# Patient Record
(demographics unavailable — no encounter records)

---

## 2017-04-09 NOTE — RADHPO
EXAM DATE/TIME:  04/09/2017 03:28 

 

[facility] COMPARISON:     

CHEST SINGLE AP, April 09, 2017, 2:31.

 

 

INDICATIONS :     

Trauma. Fall.

                  

 

ORAL CONTRAST:      

No oral contrast ingested.

                  

 

RADIATION DOSE:     

6.68 CTDIvol (mGy) 

 

 

MEDICAL HISTORY :     

Carcinoma, esophageal. Carcinoma, gastric. 

 

SURGICAL HISTORY :       

Partial gastrectomy

 

ENCOUNTER:      

Initial

 

ACUITY:      

1 day

 

PAIN SCALE:      

10/10

 

LOCATION:       

Left  hip

 

TECHNIQUE:     

Volumetric scanning of the abdomen and pelvis was performed.  Using automated exposure control and ad
justment of the mA and/or kV according to patient size, radiation dose was kept as low as reasonably 
achievable to obtain optimal diagnostic quality images. 

 

FINDINGS:     

 

LOWER LUNGS:     

The patient is status post distal esophagectomy and gastrectomy. Small bowel seen in the posterior ri
ght chest and at the left base accounting for the density seen on the chest x-ray. There is mild scar
ring or linear atelectasis seen at the posterior medial left lung base.

 

LIVER:     

Homogeneous density without lesion.  There is no dilation of the biliary tree. The patient is status 
post cholecystectomy.

 

SPLEEN:     

Normal size without lesion.

 

PANCREAS:     

Within normal limits. 

 

KIDNEYS:     

Normal in size and shape.  There is no stone, or hydronephrosis. There is a 0.6 cm hyperdense mass at
 the posterior lateral mid left kidney likely representing a hemorrhagic or proteinaceous cyst.

 

ADRENAL GLANDS:     

Within normal limits.

 

VASCULAR:     

There is no aortic aneurysm. Vascular calcifications are seen throughout the arterial system.

 

BOWEL/MESENTERY:     

The patient is status post gastrectomy. There's a moderate amount of stool seen throughout the colon.


 

ABDOMINAL WALL:     

Within normal limits.

 

RETROPERITONEUM:     

There is no lymphadenopathy.

 

BLADDER:     

No wall thickening or mass.

 

REPRODUCTIVE:     

Within normal limits.

 

INGUINAL:     

There is no lymphadenopathy or hernia.

 

MUSCULOSKELETAL:     

There is a left intertrochanteric femoral neck fracture. There is hemorrhage seen in the soft tissues
 of the upper thigh around the fracture.

 

CONCLUSION:     

1. Left intertrochanteric femoral neck fracture.

2. Post operative changed from distal esophagectomy and gastrectomy.

 

 

 

 John Dowling MD on April 09, 2017 at 3:48           

Board Certified Radiologist.

 This report was verified electronically.

## 2017-04-09 NOTE — MB
cc:

BRAULIO TRIVEDI M.D.

****

 

 

DATE OF CONSULTATION:  4/09/2017

 

REASON FOR CONSULTATION:

Left intertrochanteric hip fracture.

 

HISTORY

The patient is a 66-year-old male who presents to [facility]

Emergency Room after a fall in a group home.  The patient developed severe

onset of left hip pain after this fall.  He was unable to stand or ambulate.

He was brought to the emergency room and x-rays confirmed evidence of a

displaced left intertrochanteric hip fracture.  He denies hitting his head,

denies loss of consciousness or other significant complaints.  The patient is

constant, severe throbbing.  The patient is admitted to the medical service.

Orthopedic surgery has been consulted for further evaluation and management of

the injury and condition.

 

PAST MEDICAL HISTORY:

Positive for anxiety, depression.

Esophageal cancer with partial esophajectomy.

Gastrectomy.

 

SOCIAL HISTORY

No tobacco, alcohol or substance abuse.

 

ALLERGIES

COMPAZINE, PENICILLIN, SULFA

 

MEDICATIONS

Include:

1. Abilify.

2. Wellbutrin.

3. Trazodone.

4. Namenda

5. Venlafaxine.

6. Loperamide.

 

REVIEW OF SYSTEMS:

Negative for ten systems other than in the HPI.

 

 

 

PHYSICAL EXAMINATION:

The patient is a well-developed male lying in bed, in mild distress related to

his left hip.  He is awake, alert.

SKIN: Warm and dry.

HEENT: Normocephalic, atraumatic. Pupils are round. No scleral icterus.

NECK: Supple.

LUNGS: Clear.

HEART: Regular rate and rhythm.

ABDOMEN: Soft, nontender.

His left thigh is swollen and ecchymotic.  He has ____ motion of the left thigh

region.  He flexes his ankles and toes.  Brisk cap refill sensation intact

distally.

VITAL SIGNS:  Temperature 99, pulse of 80, respiratory rate 18, blood pressure

118/74.

 

LABORATORY DATA:

White blood cell count is 9, hemoglobin 12, hematocrit 36, platelet 165, BUN

55, creatinine 3.7, glucose 147.

 

IMPRESSION

66-year old male status post fall, left displaced intertrochanteric hip

fracture.  He does have kidney disease with a creatinine of 3.7.  He has

elevated glucose.

 

Of note, CT scan of the head shows no intracranial abnormality.

 

Discussed the diagnosis with the patient and the treatment options, options of

nonoperative versus surgery.  Surgery consists of open reduction, internal

fixation.

 

The risks of surgery were discussed which include but not limited to

anesthesia, bleeding, infection, damage to nerves, blood vessels, pain,

stiffness, failure of hardware, nonunion, malunion, blood clots, even death.

The patient has asked appropriate questions to have an answer.  He is in favor

of proceeding with surgery.  He does wish to have his left hip fracture

repaired surgically.  Written consent has been obtained. Surgical site has been

marked. I did speak with the patient's POA by telephone and explained the 
diagnosis, 

treatment options, risks, benefits. POA did conscent for surgery. 

 

 

                              _________________________________

                              MD JOHNIE Moore/NICOLE

D:  4/9/2017/8:35 AM

T:  4/9/2017/9:23 AM

Visit #:  [id number]

Job #:  [id number]

MTDD

## 2017-04-09 NOTE — PD
HPI


Chief Complaint:  Fall


Time Seen by Provider:  01:54


Travel History


International Travel<30 days:  No


Contact w/Intl Traveler<30days:  No


Traveled to known affect area:  No





History of Present Illness


HPI


66-year-old male presents to the emergency department by nonemergent transport 

from his group home with a group home staff member for evaluation of hip pain.  

Patient reportedly around 10 PM was requesting a wheelchair and reportedly 

there was not a wheelchair available and the patient was reportedly witnessed 

to have an acting out spell throwing himself on the floor.  This was reportedly 

was a witnessed event.  Patient complained of hip pain afterwards.  Due to his 

persistent complaint of pain was determined to bring him to the emergency room.

  Patient did hit his head, he did not have loss of consciousness, he did not 

injure his neck, he did not injure his back chest abdomen or upper extremities.

  Patient did not injure his right lower extremity.  Patient complains of hip 

pain.  After the patient fell onto the floor.  The group home staff members put 

him into a wheelchair at that time.  Patient had a bowel movement so they took 

him to the shower and while he was at the shower he had a witnessed syncopal 

episode on the floor without injury.  Patient spontaneously regained 

consciousness reportedly.  Patient was base and then put back in a wheelchair.  

Because of hip pain was brought to the emergency room.  Patient's had no 

further syncope or near syncope however noted upon arrival to have low normal 

pressure and on recheck was hypotensive.





PFSH


Past Medical History


*** Narrative Medical


Anxiety depression esophageal cancer with partial esophagectomy gastrectomy 

dementia (; no tobacco use no alcohol use no substance use; nursing notes 

reviewed


Anxiety:  Yes


Depression:  Yes


Cancer:  Yes (esophageal and gastric)


Dementia:  Yes


Tetanus Vaccination:  < 5 Years


Influenza Vaccination:  Yes





Past Surgical History


Abdominal Surgery:  Yes


Other Surgery:  Yes (partial gastrectomy)





Social History


Alcohol Use:  No


Tobacco Use:  No


Substance Use:  No





Allergies-Medications


(Allergen,Severity, Reaction):  


Coded Allergies:  


     Compazine (Unverified  Allergy, Unknown, 4/9/17)


     Penicillin (Unverified  Allergy, Unknown, 4/9/17)


     Sulfa (Unverified  Allergy, Unknown, 4/9/17)


Reported Meds & Prescriptions





Reported Meds & Active Scripts


Active


Abilify (Aripiprazole) 2 Mg Tab 2 Mg PO DAILY@0600


Wellbutrin SR 12 HR (Bupropion HCl) 150 Mg Tab 150 Mg PO 1QAM,1Q4PM


Trazodone (Trazodone HCl) 50 Mg Tab 50 Mg PO HS


Namenda (Memantine) 10 Mg Tab 10 Mg PO BID


Venlafaxine ER 24 HR (Venlafaxine HCl) 150 Mg Cap 150 Mg PO HS


Reported


Loperamide (Loperamide HCl) 2 Mg Cap 2 Mg PO AS DIRECTED PRN


     One capsule after each loose stool.


     Not to exceed 8 capsules per day.


Integra (Multi-Vit/Iron-B Comp-Vit C) Unknown Strength Cap Unknown Dose  








Review of Systems


Except as stated in HPI:  all other systems reviewed are Neg


General / Constitutional:  No: Fever


HENT:  No: Congestion, Neck Pain


Cardiovascular:  No: Chest Pain or Discomfort


Respiratory:  No: Shortness of Breath


Gastrointestinal:  No: Abdominal Pain


Genitourinary:  No: Flank Pain


Musculoskeletal:  Positive: Pain (hip)


Skin:  Positive Rash (chronic)


Neurologic:  Positive: Syncope,  No: Weakness, Dizziness, Focal Abnormalities, 

Coordination Problem, Headache, Change in Mentation, Slurred Speech, Paresthesia

, Seizures


Psychiatric:  No: Anxiety


Endocrine:  No: Heat Intolerance


Hematologic/Lymphatic:  No: Easy Bruising





Physical Exam


Narrative


GENERAL: Well-developed thin disheveled male in no acute distress no 

respiratory distress; GCS 14 ('normal' baseline per caregiver-Chrales Cohen- at 

bedside) thinking.  


SKIN: Warm and dry.


HEAD: Atraumatic. Normocephalic except for posterior scalp abrasion no 

laceration no soft tissue swelling no bony abnormalities. 


EYES: Pupils equal and round. No scleral icterus. No injection or drainage. 


ENT: No nasal bleeding or discharge.  Mucous membranes pink and moist.


NECK: Trachea midline. No JVD.  No midline tenderness to direct palpation no 

bony step-off.


CARDIOVASCULAR: Regular rate and rhythm.  


RESPIRATORY: No accessory muscle use. Clear to auscultation. Breath sounds 

equal bilaterally. 


GASTROINTESTINAL: Abdomen soft, non-tender, nondistended. Hepatic and splenic 

margins not palpable. 


MUSCULOSKELETAL: Extremities without clubbing, cyanosis, or edema.  Left lower 

extremity shortened and internally rotated with left hip deformity; bilateral 

dorsalis pedis pulses 2+ to palpation. 


NEUROLOGICAL: Awake and alert. No obvious cranial nerve deficits.  Motor 

grossly within normal limits. Five out of 5 muscle strength in the arms and 

legs.  Normal speech.





Data


Data


Last Documented VS





Vital Signs








  Date Time  Temp Pulse Resp B/P Pulse Ox O2 Delivery O2 Flow Rate FiO2


 


4/9/17 04:25  80 18 118/74 99 Room Air  


 


4/9/17 01:40 99.3       








Orders





 Electrocardiogram (4/9/17 01:54)


Complete Blood Count With Diff (4/9/17 01:54)


Comprehensive Metabolic Panel (4/9/17 01:54)


Prothrombin Time / Inr (Pt) (4/9/17 01:54)


Act Partial Throm Time (Ptt) (4/9/17 01:54)


Urinalysis - C+S If Indicated (4/9/17 01:54)


Type And Screen (4/9/17 01:54)


Chest, Single Ap (4/9/17 01:54)


Femur (Ap & Lat/2vws) (4/9/17 01:54)


Iv Access Insert/Monitor (4/9/17 01:54)


Oximetry (4/9/17 01:54)


Ecg Monitoring (4/9/17 01:54)


Sodium Chloride 0.9% Flush (Ns Flush) (4/9/17 02:00)


Sodium Chlor 0.9% 1000 Ml Inj (Ns 1000 M (4/9/17 02:00)


Ct Brain W/O Iv Contrast(Rout) (4/9/17 )


Ct Cerv Spine W/O Contrast (4/9/17 )


Pelvis, Ap Only (Routine) (4/9/17 01:54)


Sodium Chlorid 0.9% 500 Ml Inj (Ns 500 M (4/9/17 03:15)


Lactic Acid (4/9/17 03:12)


Ct Abd/Pel W/O Iv Contrast (4/9/17 )


Magnesium (Mg) (4/9/17 02:00)


Admit Order (Ed Use Only) (4/9/17 )


^ Saline Lock (4/9/17 04:37)


Resp Oxygen [place] C Titrat 1-4 L (4/9/17 )


Notify Dr: Other (4/9/17 04:37)


Sodium Chloride 0.9% Flush (Ns Flush) (4/9/17 09:00)


Sodium Chloride 0.9% Flush (Ns Flush) (4/9/17 04:45)


Consult Orthopedic (4/9/17 04:37)





Labs





 Laboratory Tests








Test 4/9/17 4/9/17





 02:00 04:15


 


White Blood Count 9.9 TH/MM3 


 


Red Blood Count 4.07 MIL/MM3 


 


Hemoglobin 12.1 GM/DL 


 


Hematocrit 36.5 % 


 


Mean Corpuscular Volume 89.8 FL 


 


Mean Corpuscular Hemoglobin 29.7 PG 


 


Mean Corpuscular Hemoglobin 33.1 % 





Concent  


 


Red Cell Distribution Width 13.3 % 


 


Platelet Count 165 TH/MM3 


 


Mean Platelet Volume 7.8 FL 


 


Neutrophils (%) (Auto) 79.2 % 


 


Lymphocytes (%) (Auto) 9.8 % 


 


Monocytes (%) (Auto) 10.8 % 


 


Eosinophils (%) (Auto) 0.0 % 


 


Basophils (%) (Auto) 0.2 % 


 


Neutrophils # (Auto) 7.8 TH/MM3 


 


Lymphocytes # (Auto) 1.0 TH/MM3 


 


Monocytes # (Auto) 1.1 TH/MM3 


 


Eosinophils # (Auto) 0.0 TH/MM3 


 


Basophils # (Auto) 0.0 TH/MM3 


 


CBC Comment DIFF FINAL  


 


Differential Comment   


 


Prothrombin Time 15.0 SEC 


 


Prothromb Time International 1.3 RATIO 





Ratio  


 


Activated Partial 26.9 SEC 





Thromboplast Time  


 


Sodium Level 138 MEQ/L 


 


Potassium Level 5.0 MEQ/L 


 


Chloride Level 99 MEQ/L 


 


Carbon Dioxide Level 23.5 MEQ/L 


 


Anion Gap 16 MEQ/L 


 


Blood Urea Nitrogen 55 MG/DL 


 


Creatinine 3.70 MG/DL 


 


Estimat Glomerular Filtration 17 ML/MIN 





Rate  


 


Random Glucose 147 MG/DL 


 


Calcium Level 8.6 MG/DL 


 


Magnesium Level 2.5 MG/DL 


 


Total Bilirubin 0.3 MG/DL 


 


Aspartate Amino Transf 128 U/L 





(AST/SGOT)  


 


Alanine Aminotransferase 82 U/L 





(ALT/SGPT)  


 


Alkaline Phosphatase 114 U/L 


 


Total Protein 7.6 GM/DL 


 


Albumin 3.3 GM/DL 


 


Blood Type O POSITIVE  


 


Antibody Screen NEGATIVE  


 


Blood Bank Comment   


 


Lactic Acid Level  1.5 mmol/L











MDM


Medical Decision Making


Medical Screen Exam Complete:  Yes


Emergency Medical Condition:  Yes


Medical Record Reviewed:  Yes


Interpretation(s)


CBC & BMP Diagram


4/9/17 02:00








EKG normal sinus rhythm rate 81 no acute ST elevation or injury pattern change 

noted


Differential Diagnosis


Hip pain, hip fracture, dislocation, anemia, pelvic fracture, syncope, 

arrhythmia, ICH, CHI, abrasion


Narrative Course


Patient placed on monitor IV access obtained specimens were collected; patient 

was noted to be hypotensive therefore IV fluids administered; imaging studies 

ordered


CT brain noncontrast reveals no acute intra-cranial abnormality no skull 

fracture or cervical spine shows chronic changes but no fracture chest x-ray 

there was evidence of previous esophageal and gastric surgery but no acute 

abnormalities pelvis and left hip x-ray reveals an intertrochanteric fracture 

of the left hip.  CT abdomen and pelvis performed without contrast due to renal 

function reveals no acute intra-abdominal or pelvic abnormality again 

intertrochanteric fracture is identified and local area of hemorrhage also 

noted.


Patient's case discussed with on-call orthopedist Dr. De Santiago requests patient 

to be transferred to [facility] admitted to have his group: Patient's case 

discussed with Dr. Salazar accepted patient for admission





Physician Communication


Physician Communication


Discussed with Dr De Santiago admit to [facility] service





Diagnosis





 Primary Impression:  


 Closed intertrochanteric fracture of hip


 Qualified Code:  S72.142A - Closed intertrochanteric fracture of hip, left, 

initial encounter


 Additional Impressions:  


 Renal insufficiency


 Dementia


 Qualified Code:  F03.91 - Dementia with behavioral disturbance, unspecified 

dementia type





Admitting Information


Admitting Physician Requests:  Admit








Gaby Rivera MD Apr 9, 2017 04:22

## 2017-04-09 NOTE — HHI.HP
__________________________________________________





[facility]


Service


[facility]


Primary Care Physician


Non-Staff


Admission Diagnosis


L hip fracture; renal insufficiency; syncope


Diagnoses:  


(1) Closed fracture of intertrochanteric section of femur


(2) Closed intertrochanteric fracture of hip


(3) CKD (chronic kidney disease) stage 4, GFR 15-29 ml/min


(4) Dementia


(5) Anxiety and depression


Chief Complaint:  


hip pain


Travel History


International Travel<30 Days:  No


Contact w/Intl Traveler <30 Da:  No


Traveled to Known Affected Are:  No


History of Present Illness


66yrs old man with a PMH of Dementia, Anxiety/Depression and a resident of a 

Local group home was brought to the ED for evaluation of hip pain s/p 

mechanical fall which happened around 10PM last night when patient became upset 

with the staff as a wheelchair that was requested and was not available at the 

time; patient threw himself on the floor. Pelvic and femur x-ray revealed 

finding of  Intertrochanteric left femoral neck fracture for which Orthopedic 

surgery was consulted and patient taken to the OR today and underwent open 

reduction internal fixation.  Patient was seen in PACU, where he was stable





Review of Systems


Other 12 systems reviewed and are negative except for the one mentioned in 

history of present illness





Past Family Social History


Past Medical History


esophageal cancer with partial esophagectomy gastrectomy 


Anxiety:  Yes


Depression:  Yes


Dementia: 





Past Surgical History


partial gastrectomy








Reported Medications


Abilify (Aripiprazole) 2 Mg Tab 2 Mg PO DAILY@0600


Wellbutrin SR 12 HR (Bupropion HCl) 150 Mg Tab 150 Mg PO 1QAM,1Q4PM


Trazodone (Trazodone HCl) 50 Mg Tab 50 Mg PO HS


Namenda (Memantine) 10 Mg Tab 10 Mg PO BID


Venlafaxine ER 24 HR (Venlafaxine HCl) 150 Mg Cap 150 Mg PO HS


Reported


Loperamide (Loperamide HCl) 2 Mg Cap 2 Mg PO AS DIRECTED PRN


     One capsule after each loose stool.


     Not to exceed 8 capsules per day.


Integra (Multi-Vit/Iron-B Comp-Vit C) Unknown Strength Cap Unknown Dose


Allergies:  


Coded Allergies:  


     Compazine (Unverified  Allergy, Unknown, 4/9/17)


     Penicillin (Unverified  Allergy, Unknown, 4/9/17)


     Sulfa (Unverified  Allergy, Unknown, 4/9/17)


Social History


Alcohol Use:  No


Tobacco Use:  No


Substance Use:  No





Physical Exam


Vital Signs





 Vital Signs








  Date Time  Temp Pulse Resp B/P Pulse Ox O2 Delivery O2 Flow Rate FiO2


 


4/9/17 09:25 99.7    99   


 


4/9/17 07:43 99.7 84 17 109/71 95   


 


4/9/17 06:59   18     


 


4/9/17 06:58  87 18 96/62 98 Room Air  


 


4/9/17 06:25  85 18 105/68 97 Room Air  


 


4/9/17 05:03     98   21


 


4/9/17 05:03  82 18 121/71 98 Room Air  


 


4/9/17 04:43  87 17 106/41 98 Room Air  


 


4/9/17 04:25  80 18 118/74 99 Room Air  


 


4/9/17 04:00  84 17 105/74 99 Room Air  


 


4/9/17 03:18  84 16 92/64  Room Air  


 


4/9/17 03:00  85 17 92/64 94 Room Air  


 


4/9/17 01:40 99.3 89 18 86/58 97 Room Air  


 


4/9/17 01:21      Room Air  


 


4/9/17 01:00 99.6 89 18 103/66 98   


 


4/9/17 00:20 98.7 102 18 80/60 99   








Physical Exam


GENERAL: This is a well-nourished, well-developed patient, in no apparent 

distress.


SKIN: No rashes, ecchymoses or lesions. Cool and dry.


HEAD: Atraumatic. Normocephalic. No temporal or scalp tenderness.


EYES: Pupils equal round and reactive. Extraocular motions intact. No scleral 

icterus. No injection or drainage. 


ENT: Nose without bleeding, purulent drainage or septal hematoma. Throat 

without erythema, tonsillar hypertrophy or exudate. Uvula midline. Airway 

patent.


NECK: Trachea midline. No JVD or lymphadenopathy. Supple, nontender, no 

meningeal signs.


CARDIOVASCULAR: Regular rate and rhythm without murmurs, gallops, or rubs. 


RESPIRATORY: Clear to auscultation. Breath sounds equal bilaterally. No wheezes

, rales, or rhonchi.  


GASTROINTESTINAL: Abdomen soft, non-tender, nondistended. No hepato-splenomegaly

, or palpable masses. No guarding.


MUSCULOSKELETAL: Extremities without clubbing, cyanosis, or edema. No joint 

tenderness, effusion, or edema noted. No calf tenderness. Negative Homans sign 

bilaterally.


NEUROLOGICAL: Awake and alert. Cranial nerves II through XII intact.  Motor and 

sensory grossly within normal limits. Five out of 5 muscle strength in all 

muscle groups.  Normal speech.


Laboratory





Laboratory Tests








Test 4/9/17 4/9/17 4/9/17





 02:00 04:15 05:00


 


White Blood Count 9.9   


 


Red Blood Count 4.07   


 


Hemoglobin 12.1   


 


Hematocrit 36.5   


 


Mean Corpuscular Volume 89.8   


 


Mean Corpuscular Hemoglobin 29.7   


 


Mean Corpuscular Hemoglobin 33.1   





Concent   


 


Red Cell Distribution Width 13.3   


 


Platelet Count 165   


 


Mean Platelet Volume 7.8   


 


Neutrophils (%) (Auto) 79.2   


 


Lymphocytes (%) (Auto) 9.8   


 


Monocytes (%) (Auto) 10.8   


 


Eosinophils (%) (Auto) 0.0   


 


Basophils (%) (Auto) 0.2   


 


Neutrophils # (Auto) 7.8   


 


Lymphocytes # (Auto) 1.0   


 


Monocytes # (Auto) 1.1   


 


Eosinophils # (Auto) 0.0   


 


Basophils # (Auto) 0.0   


 


CBC Comment DIFF FINAL   


 


Differential Comment    


 


Prothrombin Time 15.0   


 


Prothromb Time International 1.3   





Ratio   


 


Activated Partial 26.9   





Thromboplast Time   


 


Sodium Level 138   


 


Potassium Level 5.0   


 


Chloride Level 99   


 


Carbon Dioxide Level 23.5   


 


Anion Gap 16   


 


Blood Urea Nitrogen 55   


 


Creatinine 3.70   


 


Estimat Glomerular Filtration 17   





Rate   


 


Random Glucose 147   


 


Calcium Level 8.6   


 


Magnesium Level 2.5   


 


Total Bilirubin 0.3   


 


Aspartate Amino Transf 128   





(AST/SGOT)   


 


Alanine Aminotransferase 82   





(ALT/SGPT)   


 


Alkaline Phosphatase 114   


 


Total Protein 7.6   


 


Albumin 3.3   


 


Blood Type O POSITIVE   


 


Antibody Screen NEGATIVE   


 


Blood Bank Comment    


 


Lactic Acid Level  1.5  


 


Urine Color   YELLOW 


 


Urine Turbidity   SLIGHT 


 


Urine pH   5.5 


 


Urine Specific Gravity   1.021 


 


Urine Protein   30 


 


Urine Glucose (UA)   NEG 


 


Urine Ketones   NEG 


 


Urine Occult Blood   LARGE 


 


Urine Nitrite   NEG 


 


Urine Bilirubin   NEG 


 


Urine Leukocyte Esterase   NEG 


 


Urine RBC   4-9 


 


Urine WBC   0-2 


 


Urine Squamous Epithelial   0-5 





Cells   


 


Urine Amorphous Sediment   FEW 


 


Urine Bacteria   NONE 


 


Microscopic Urinalysis Comment   CATH-CULT NOT





   IND








Result Diagram:  


4/9/17 0200 4/9/17 0200





Imaging





Last Impressions








Pelvis X-Ray 4/9/17 0154 Signed





Impressions: 





 Service Date/Time:  Sunday, April 9, 2017 02:27 - CONCLUSION:  Left 





 intertrochanteric femoral neck fracture.     John Dowling MD 


 


Femur X-Ray 4/9/17 0154 Signed





Impressions: 





 Service Date/Time:  Sunday, April 9, 2017 02:32 - CONCLUSION:  

Intertrochanteric 





 left femoral neck fracture.     John Dowling MD 


 


Chest X-Ray 4/9/17 0154 Signed





Impressions: 





 Service Date/Time:  Sunday, April 9, 2017 02:31 - CONCLUSION:  1. 

Postoperative 





 changes in the right chest which appear stable. 2. Increased density in the 





 medial left base representing either a consolidation or potentially a hiatal 





 hernia. There are some air lucency in this.     John Dowling MD 


 


Head CT 4/9/17 0000 Signed





Impressions: 





 Service Date/Time:  Sunday, April 9, 2017 02:56 - CONCLUSION:  1. No 





 intracranial abnormality is seen. 2. Right sinus disease.     John Dowling MD 


 


Cervical Spine CT 4/9/17 0000 Signed





Impressions: 





 Service Date/Time:  Sunday, April 9, 2017 02:56 - CONCLUSION:  Scattered 





 degenerative change as described above. An acute bony abnormality is not seen.

   





  John Dowling MD 


 


Abdomen/Pelvis CT 4/9/17 0000 Signed





Impressions: 





 Service Date/Time:  Sunday, April 9, 2017 03:28 - CONCLUSION:  1. Left 





 intertrochanteric femoral neck fracture. 2. Post operative changed from distal 





 esophagectomy and gastrectomy.     John Dowling MD 











Assessment and Plan


Problem List:  


(1) Closed intertrochanteric fracture of hip


ICD Code:  S72.143A


Status:  Acute


(2) Dementia


ICD Code:  F03.90


Status:  Acute


(3) CKD (chronic kidney disease) stage 4, GFR 15-29 ml/min


ICD Code:  N18.4


Status:  Acute


Assessment and Plan


66-year-old man with





Closed intertrochanteric fracture of the left hip: Pelvic and femur x-ray noted 

and reviewed by me with finding of  Intertrochanteric left femoral neck fracture

; consult orthopedic surgery for evaluation for open reduction internal 

fixation.  PT consult post procedure.Pain Management accordingly with when 

necessary analgesic/morphine





Chronic kidney disease stage IV: Check renal ultrasound and consult nephrology.

  Continue with gentle IV fluid hydrations and monitor BUN and creatinine.  

Avoid all nephrotoxic drugs.





Dementia: Resume Namenda





Anxiety/depression: Resume outpatient medications





DVT prophylaxis: Postprocedure per orthopedic surgery


Code Status


Full code





Physician Certification


2 Midnight Certification Type:  Admission for Inpatient Services


Order for Inpatient Services


The services are ordered in accordance with Medicare regulations or non-

Medicare payer requirements, as applicable.  In the case of services not 

specified as inpatient-only, they are appropriately provided as inpatient 

services in accordance with the 2-midnight benchmark.


Estimated LOS (days):  2


 days is the estimated time the patient will need to remain in the hospital, 

assuming treatment plan goals are met and no additional complications.


Post-Hospital Plan:  Not yet determined





Problem Qualifiers





(1) Closed intertrochanteric fracture of hip:  


Qualified Code:  S72.142A - Closed intertrochanteric fracture of hip, left, 

initial encounter


(2) Dementia:  


Qualified Code:  F03.91 - Dementia with behavioral disturbance, unspecified 

dementia type





Calixto Cárdenas MD Apr 9, 2017 10:46

## 2017-04-09 NOTE — RADRPT
EXAM DATE/TIME:  04/09/2017 21:32 

 

[facility] COMPARISON:     

No previous studies available for comparison.

 

EXTERNAL COMPARISON :    

[facility], CT ABDOMEN & PELVIS W CONTRAST, January 26, 2016[facility], CT ABDOMEN 
& PELVIS W CONTRAST, January 05, 2015. 

 

 

INDICATIONS :     

Increased BUN/Creatinine. 

                                                        

 

MEDICAL HISTORY :           

Dementia. Gastric and esophageal cancer. 

 

SURGICAL HISTORY :          

Partial gastrectomy. 

 

ENCOUNTER:     

Initial

 

ACUITY:     

1 day

 

PAIN SCORE:     

3/10

 

LOCATION:     

Bilateral flank 

MEASUREMENTS:     

 

RIGHT KIDNEY:     

8.8 x 3.6 x 3.6 cm

 

LEFT KIDNEY:     

8.7 x 4.2 x 4.7 cm

 

FINDINGS:     

 

RIGHT KIDNEY:     

Renal cortex is normal in thickness and echotexture.  No hydronephrosis, stone, or mass.  

 

LEFT KIDNEY:     

Renal cortex is normal in thickness and echotexture.  No hydronephrosis, stone, or mass.  

 

BLADDER:     

Within normal limits given the degree of distension.  

 

CONCLUSION:     Normal examination.  

 

 

 

 Marcial Wells MD on April 09, 2017 at 22:18           

Board Certified Radiologist.

 This report was verified electronically.

## 2017-04-09 NOTE — RADHPO
EXAM DATE/TIME:  04/09/2017 02:32 

 

[facility] COMPARISON:     

No previous studies available for comparison.

 

                     

INDICATIONS :     

Fall. Left hip pain.

                     

 

MEDICAL HISTORY :     

None.          

 

SURGICAL HISTORY :     

None.   

 

ENCOUNTER:     

Initial                                        

 

ACUITY:     

1 day      

 

PAIN SCORE:     

9/10

 

LOCATION:     

Left lateral 

 

FINDINGS:     

There is an intertrochanteric femoral neck fracture. There is a separate fracture fragment at the les
ser trochanter. The femoral head is normally positioned at the acetabulum.

 

CONCLUSION:     

Intertrochanteric left femoral neck fracture.

 

 

 

 John Dowling MD on April 09, 2017 at 2:49           

Board Certified Radiologist.

 This report was verified electronically.

## 2017-04-09 NOTE — RADHPO
EXAM DATE/TIME:  04/09/2017 02:56 

 

[facility] COMPARISON:     

CT BRAIN W/O CONTRAST, May 05, 2015, 21:08.

 

 

INDICATIONS :     

Trauma. Fall.

                      

 

RADIATION DOSE:     

64.44 CTDIvol (mGy) 

 

 

 

MEDICAL HISTORY :     

Carcinoma, esophageal. Carcinoma, gastric. 

 

SURGICAL HISTORY :       

Partial gastrectomy

 

ENCOUNTER:      

Initial

 

ACUITY:      

1 day

 

PAIN SCALE:      

0/10

 

LOCATION:        

cranial 

 

TECHNIQUE:     

Multiple contiguous axial images were obtained of the head.  Using automated exposure control and adj
ustment of the mA and/or kV according to patient size, radiation dose was kept as low as reasonably a
chievable to obtain optimal diagnostic quality images. 

 

FINDINGS:     

 

CEREBRUM:     

The ventricles are normal for age.  No evidence of midline shift, mass lesion, hemorrhage or acute in
farction.  No extra-axial fluid collections are seen.

 

POSTERIOR FOSSA:     

The cerebellum and brainstem are intact.  The 4th ventricle is midline.  The cerebellopontine angle i
s unremarkable.

 

EXTRACRANIAL:     

The visualized portion of the orbits is intact. There is opacification of the right maxillary sinus w
ith expansion into the nasal cavity likely representing a mucocele or polyposis. This was present pre
viously. There is right frontal and right ethmoid sinus disease.

 

SKULL:     

The calvaria is intact.  No evidence of skull fracture.

 

CONCLUSION:     

1. No intracranial abnormality is seen.

2. Right sinus disease.

 

 

 

 John Dowling MD on April 09, 2017 at 3:21           

Board Certified Radiologist.

 This report was verified electronically.

## 2017-04-09 NOTE — RADHPO
EXAM DATE/TIME:  04/09/2017 02:27 

 

[facility] COMPARISON:     

FEMUR LEFT (AP & LAT/2VWS), April 09, 2017, 2:32.

 

                     

INDICATIONS :     

Fall. Left hip pain.

                     

 

MEDICAL HISTORY :     

None.          

 

SURGICAL HISTORY :     

None.   

 

ENCOUNTER:     

Initial                                        

 

ACUITY:     

1 day      

 

PAIN SCORE:     

9/10

 

LOCATION:     

Right pelvis 

 

FINDINGS:     

There is a left intertrochanteric femoral neck fracture. There is fracture through the superior aspec
t of the greater trochanter and at the base of the lesser trochanter. The femoral head is normally po
sitioned at the acetabulum. No other fracture is seen. Vascular calcifications are present.

 

CONCLUSION:     

Left intertrochanteric femoral neck fracture.

 

 

 

 John Dowling MD on April 09, 2017 at 2:50           

Board Certified Radiologist.

 This report was verified electronically.

## 2017-04-09 NOTE — RADHPO
EXAM DATE/TIME:  04/09/2017 02:31 

 

[facility] COMPARISON:     

SPINE THORACIC AP/LAT/SW (3VW), May 05, 2015, 20:41.

 

                     

INDICATIONS :     

Fall. 

                     

 

MEDICAL HISTORY :     

None.          

 

SURGICAL HISTORY :     

None.   

 

ENCOUNTER:     

Initial                                        

 

ACUITY:     

1 day      

 

PAIN SCORE:     

6/10

 

LOCATION:     

Bilateral chest 

 

FINDINGS:     

The heart size is normal. There is increased density at the medial right chest and over the medial hi
lar region. Staples are seen in this region. This configuration is unchanged for prior thoracic spine
 series from 5/5/2015. There is increased density in the retrocardiac area at the medial left base. T
he lungs are otherwise clear. No effusion is seen.

 

CONCLUSION:     

1. Postoperative changes in the right chest which appear stable.

2. Increased density in the medial left base representing either a consolidation or potentially a hia
patria hernia. There are some air lucency in this.

 

 

 

 John Dowling MD on April 09, 2017 at 2:51           

Board Certified Radiologist.

 This report was verified electronically.

## 2017-04-09 NOTE — RADRPT
EXAM DATE/TIME:  04/09/2017 10:59 

 

[facility] COMPARISON:     

FEMUR LEFT (AP & LAT/2VWS), April 09, 2017, 2:32.

 

                     

INDICATIONS :     

Left hip pain.

                     

 

MEDICAL HISTORY :     

None.          

 

SURGICAL HISTORY :     

None.   

 

ENCOUNTER:     

Initial                                        

 

ACUITY:     

1 day      

 

PAIN SCORE:     

Non-responsive.

 

LOCATION:     

Left  hip

 

FINDINGS:     

2 fluoroscopic views of the postoperative left hip demonstrate placement of intramedullary morelia and sc
rew through the intertrochanteric fracture with good anatomic alignment.

 

 

CONCLUSION:     

Status post ORIF of a left intratrochanteric fracture with good anatomic alignment.

 

 

 

 Susan Mathur MD on April 09, 2017 at 11:40           

Board Certified Radiologist.

 This report was verified electronically.

## 2017-04-10 NOTE — EKG
Date Performed: 04/09/2017       Time Performed: 02:00:52

 

PTAGE:      66 years

 

EKG:      Sinus arrhythmia. Since previous tracing, no significant change noted Normal ECG

 

PREVIOUS TRACING       : 10/13/2014 10.14

 

DOCTOR:   Karuna Don  Interpretating Date/Time  04/10/2017 14:12:58

## 2017-04-10 NOTE — HHI.PR
Subjective


Remarks


F-u left hip fx s/p repair


04/10/17-patient seen and examined; no acute event overnight. Pain to the left 

hip stable





Objective


Vitals





 Vital Signs








  Date Time  Temp Pulse Resp B/P Pulse Ox O2 Delivery O2 Flow Rate FiO2


 


4/10/17 09:59     94 Nasal Cannula 2.00 


 


4/10/17 09:31   18     


 


4/10/17 07:07 98.6 94 16 115/71 100   


 


4/10/17 00:00 98.7 88 17 118/70 100   


 


4/9/17 20:43     100 Nasal Cannula 2.00 


 


4/9/17 20:00 98.7 92 17 125/74 100   


 


4/9/17 16:00 97.3 78 16 92/60 100   








 I/O








 4/9/17 4/9/17 4/9/17 4/10/17 4/10/17 4/10/17





 07:00 15:00 23:00 07:00 15:00 23:00


 


Intake Total 1500 ml 1990 ml 764 ml 876 ml  


 


Output Total 400 ml 1225 ml 425 ml 650 ml  


 


Balance 1100 ml 765 ml 339 ml 226 ml  


 


      


 


Intake Oral  440 ml 240 ml 120 ml  


 


IV Total 1500 ml 250 ml 524 ml 756 ml  


 


Other  1300 ml    


 


Output Urine Total 400 ml 950 ml 425 ml 650 ml  


 


Estimated Blood Loss  75 ml    


 


Other  200 ml    


 


# Voids  0    


 


# Bowel Movements  0 0 0  








Result Diagram:  


4/10/17 0558                                                                   

             4/10/17 0558





Imaging





Last Impressions








Pelvis X-Ray 4/9/17 0154 Signed





Impressions: 





 Service Date/Time:  Sunday, April 9, 2017 02:27 - CONCLUSION:  Left 





 intertrochanteric femoral neck fracture.     John Dowling MD 


 


Femur X-Ray 4/9/17 0154 Signed





Impressions: 





 Service Date/Time:  Sunday, April 9, 2017 02:32 - CONCLUSION:  

Intertrochanteric 





 left femoral neck fracture.     John Dowling MD 


 


Chest X-Ray 4/9/17 0154 Signed





Impressions: 





 Service Date/Time:  Sunday, April 9, 2017 02:31 - CONCLUSION:  1. 

Postoperative 





 changes in the right chest which appear stable. 2. Increased density in the 





 medial left base representing either a consolidation or potentially a hiatal 





 hernia. There are some air lucency in this.     John Dowling MD 


 


Renal Ultrasound 4/9/17 0000 Signed





Impressions: 





 Service Date/Time:  Sunday, April 9, 2017 21:32 - CONCLUSION: Normal 





 examination.       Marcial Wells MD 


 


Hip X-Ray 4/9/17 0000 Signed





Impressions: 





 Service Date/Time:  Sunday, April 9, 2017 10:59 - CONCLUSION:  Status post 

ORIF 





 of a left intratrochanteric fracture with good anatomic alignment.     





 Susan Mathur MD 


 


Head CT 4/9/17 0000 Signed





Impressions: 





 Service Date/Time:  Sunday, April 9, 2017 02:56 - CONCLUSION:  1. No 





 intracranial abnormality is seen. 2. Right sinus disease.     John Dowling MD 


 


Cervical Spine CT 4/9/17 0000 Signed





Impressions: 





 Service Date/Time:  Sunday, April 9, 2017 02:56 - CONCLUSION:  Scattered 





 degenerative change as described above. An acute bony abnormality is not seen.

   





  John Dowling MD 


 


Abdomen/Pelvis CT 4/9/17 0000 Signed





Impressions: 





 Service Date/Time:  Sunday, April 9, 2017 03:28 - CONCLUSION:  1. Left 





 intertrochanteric femoral neck fracture. 2. Post operative changed from distal 





 esophagectomy and gastrectomy.     John Dowling MD 








Objective Remarks


GENERAL: NAD


SKIN: Warm and dry.


HEAD: Normocephalic.


EYES: No scleral icterus. No injection or drainage. 


NECK: Supple, trachea midline. No JVD or lymphadenopathy.


CARDIOVASCULAR: Regular rate and rhythm without murmurs, gallops, or rubs. 


RESPIRATORY: Breath sounds equal bilaterally. No accessory muscle use.


GASTROINTESTINAL: Abdomen soft, non-tender, nondistended. 


MUSCULOSKELETAL: No cyanosis, or edema. dressing over left hip incision


BACK: Nontender without obvious deformity. No CVA tenderness.








A/P


Problem List:  


(1) Closed fracture of intertrochanteric section of femur


ICD Code:  S72.143A


Status:  Acute


(2) Closed intertrochanteric fracture of hip


ICD Code:  S72.143A


Status:  Acute


(3) CKD (chronic kidney disease) stage 4, GFR 15-29 ml/min


ICD Code:  N18.4


Status:  Acute


(4) Dementia


ICD Code:  F03.90


Status:  Acute


(5) Anxiety and depression


ICD Code:  F41.9


Status:  Acute


Assessment and Plan


66-year-old man with





Closed intertrochanteric fracture of the left hip: Pelvic and femur x-ray with 

finding of  Intertrochanteric left femoral neck fracture; s/p ORIF 04/09/17 and 

management per orthopedic surgery.  PT to treat and eval .Pain Management 

accordingly with when necessary analgesic/morphine





Chronic kidney disease stage IV: Renal indices improving; renal ultrasound 

normal.  Continue with gentle IV fluid hydrations and monitor BUN and 

creatinine.  Avoid all nephrotoxic drugs.





Dementia: continue Namenda





Anxiety/depression: continue outpatient medications





DVT prophylaxis: Lovenox





Problem Qualifiers





(1) Closed intertrochanteric fracture of hip:  


Qualified Code:  S72.142A - Closed intertrochanteric fracture of hip, left, 

initial encounter


(2) Dementia:  


Qualified Code:  F03.91 - Dementia with behavioral disturbance, unspecified 

dementia type





Calixto Cárdenas MD Apr 10, 2017 12:36


Calixto Cárdenas MD Apr 10, 2017 12:36

## 2017-04-10 NOTE — PD.ORT.PN
Subjective


Post Op Day #:  1


Subjective Remarks


pain under control.





Objective


Vitals





 Vital Signs








  Date Time  Temp Pulse Resp B/P Pulse Ox O2 Delivery O2 Flow Rate FiO2


 


4/10/17 09:59     94 Nasal Cannula 2.00 


 


4/10/17 09:31   18     


 


4/10/17 07:07 98.6 94 16 115/71 100   


 


4/10/17 00:00 98.7 88 17 118/70 100   


 


4/9/17 20:43     100 Nasal Cannula 2.00 


 


4/9/17 20:00 98.7 92 17 125/74 100   


 


4/9/17 16:00 97.3 78 16 92/60 100   








 I/O








 4/9/17 4/9/17 4/9/17 4/10/17 4/10/17 4/10/17





 07:00 15:00 23:00 07:00 15:00 23:00


 


Intake Total 1500 ml 1990 ml 764 ml 876 ml  


 


Output Total 400 ml 1225 ml 425 ml 650 ml  


 


Balance 1100 ml 765 ml 339 ml 226 ml  


 


      


 


Intake Oral  440 ml 240 ml 120 ml  


 


IV Total 1500 ml 250 ml 524 ml 756 ml  


 


Other  1300 ml    


 


Output Urine Total 400 ml 950 ml 425 ml 650 ml  


 


Estimated Blood Loss  75 ml    


 


Other  200 ml    


 


# Voids  0    


 


# Bowel Movements  0 0 0  








Result Diagram:  


4/10/17 0558                                                                   

             4/10/17 0558





Objective Remarks


in bed, nad


dressing c/d/i


neg homans


nvi





Assessment & Plan


Ortho Post Op Day #:  1


Problem List:  


Assessment and Plan


s/p L troch nail


50% PWB


daily dressing changes


lovenox


d/c planning


f/up dr. ozuna 2 weeks








Anshu Ruiz Apr 10, 2017 12:46

## 2017-04-11 NOTE — MP
cc:

BRAULIO TRIVEDI M.D.

****

 

 

DATE OF SURGERY:  4/9/2017

 

PREOPERATIVE DIAGNOSIS

Left intertrochanteric hip fracture.

 

POSTOPERATIVE DIAGNOSIS

Left intertrochanteric hip fracture.

 

PROCEDURE

Intramedullary nailing left intertrochanteric hip fracture.

 

SURGEON

Dr. Braulio Trivedi

 

FIRST ASSISTANT

Braulio Ruiz PA-C

 

ANESTHESIA

General.

 

ESTIMATED BLOOD LOSS

100 cc.

 

COMPLICATIONS

None.

 

IMPLANTS USED

Synthes.

 

JUSTIFICATION

This patient is a 66-year-old male who sustained a severe injury to his left

hip after a fall with a displaced comminuted intertrochanteric hip fracture.

He was evaluated by the undersigned after consultation and admission to [facility].  The patient as well as the patient's power of  were

counseled as to the risks, benefits and alternatives to the above-named

proposed surgical procedure and they did wish to proceed with surgery.

 

PROCEDURE IN DETAIL

Written consent was obtained, and also appropriate consent from power of

.  The patient was identified, taken to the operating room and placed

supine on the operating table.  General anesthesia was administered as well as

900 mg of IV clindamycin and one gram of IV vancomycin as he does have a

penicillin allergy.  The patient was carefully transferred to the fracture

table.  All bony prominences and pressure points were well-padded.  A padded

traction boot was placed on the left foot and the right leg placed in a padded

well-leg ley.  Longitudinal traction was applied to assist in preliminary

fracture reduction.  The left hip and left lower extremity were prepped and

draped using isopropyl alcohol, Hibiclens solution and ChloraPrep solution.

 

After a timeout was performed, a longitudinal incision was made over the

lateral aspect of the left hip.  The fascial layer was incised.  A guidewire

was used to gain entrance into the intramedullary canal of the femur from the

tip of the greater trochanter.  This was followed by a cannulated entry reamer.

Subsequently, a Synthes 11 mm titanium trochanteric femoral nail was inserted

into the intramedullary canal of the femur.  A 130 degree locking jig was used

to place a guide pin centered into the femoral head on the AP and lateral

fluoroscopic projections.  Subsequently, a 95 mm spiral blade was inserted.

The top locking screw was secured to get a fixed angle sliding construct.

Distally a locking jig was used to place a single lateral to medial transverse

static locking screw.  Fluoroscopic imaging again confirmed hardware placement

and fracture reduction.

 

The surgical wound was thoroughly irrigated with sterile saline solution.  The

fascial layer was closed with #1 Vicryl suture, the subcutaneous tissue layer

with 2-0 Vicryl suture and the skin was closed with Dermabond.  Sterile

dressing was applied.  The patient tolerated the procedure well.  No

intraoperative complications noted.

 

Braulio Ruiz, physician assistant certified, was present during the entire

procedure to include patient positioning and the procedure itself.  The medical

necessity of a physician assistant was indicated in this case due to the

complexity of the procedure.  He assisted with appropriate manipulation of the

leg and also retraction of muscle, tendon, bone and neurovascular structures.

He assisted with both achieving and maintaining fracture reduction along with

preparation of bone and implantation of the internal fixation device.

 

 

 

 

                              _________________________________

                              Braulio Trivedi MD

 

 

 

JWM/STACIA

D:  4/9/2017/11:08 AM

T:  4/11/2017/9:16 AM

Visit #:  [id number]

Job #:  [id number]

## 2017-04-11 NOTE — PD.ORT.PN
Subjective


Post Op Day #:  2


Subjective Remarks


pain under control.





Objective


Vitals





 Vital Signs








  Date Time  Temp Pulse Resp B/P Pulse Ox O2 Delivery O2 Flow Rate FiO2


 


4/11/17 00:10 98.9 87 17 114/71 96   


 


4/10/17 20:10 99.1 96 17 121/80 98   


 


4/10/17 18:09     94 Nasal Cannula 2.00 


 


4/10/17 16:00 97.7 92 16 112/73 95   


 


4/10/17 12:00 98.2 100 16 107/70 100   


 


4/10/17 09:59     94 Nasal Cannula 2.00 


 


4/10/17 09:31   18     








 I/O








 4/10/17 4/10/17 4/10/17 4/11/17 4/11/17 4/11/17





 07:00 15:00 23:00 07:00 15:00 23:00


 


Intake Total 876 ml 480 ml 240 ml 120 ml  


 


Output Total 650 ml  850 ml 3 ml  


 


Balance 226 ml 480 ml -610 ml 117 ml  


 


      


 


Intake Oral 120 ml 480 ml 240 ml 120 ml  


 


IV Total 756 ml     


 


Output Urine Total 650 ml  850 ml 3 ml  


 


# Voids  0    


 


# Bowel Movements 0 0 0 0  








Result Diagram:  


4/11/17 0701                                                                   

             4/11/17 0701





Objective Remarks


in bed, nad


incision no erythema, no drainage


neg homans


nvi





Assessment & Plan


Ortho Post Op Day #:  2


Problem List:  


Assessment and Plan


s/p L troch nail


50% PWB


daily dressing changes


lovenox


d/c planning


ortho stable


f/up dr. ozuna 2 weeks








Anshu Ruiz Apr 11, 2017 08:51

## 2017-04-11 NOTE — HHI.PR
Subjective


Remarks


F-u left hip fx s/p repair


04/10/17-patient seen and examined; no acute event overnight. Pain to the left 

hip stable


04/11/17-patient seen and examined, no change and stable.  Pain to left hip 

well controlled.





Objective


Vitals





 Vital Signs








  Date Time  Temp Pulse Resp B/P Pulse Ox O2 Delivery O2 Flow Rate FiO2


 


4/11/17 09:06   18     


 


4/11/17 08:00 97.4 80 18 111/72 98   


 


4/11/17 00:10 98.9 87 17 114/71 96   


 


4/10/17 20:10 99.1 96 17 121/80 98   


 


4/10/17 18:09     94 Nasal Cannula 2.00 


 


4/10/17 16:00 97.7 92 16 112/73 95   


 


4/10/17 12:00 98.2 100 16 107/70 100   








 I/O








 4/10/17 4/10/17 4/10/17 4/11/17 4/11/17 4/11/17





 07:00 15:00 23:00 07:00 15:00 23:00


 


Intake Total 876 ml 480 ml 240 ml 120 ml  


 


Output Total 650 ml  850 ml 3 ml  


 


Balance 226 ml 480 ml -610 ml 117 ml  


 


      


 


Intake Oral 120 ml 480 ml 240 ml 120 ml  


 


IV Total 756 ml     


 


Output Urine Total 650 ml  850 ml 3 ml  


 


# Voids  0    


 


# Bowel Movements 0 0 0 0  








Result Diagram:  


4/11/17 0701 4/11/17 0701





Imaging





Last Impressions








Pelvis X-Ray 4/9/17 0154 Signed





Impressions: 





 Service Date/Time:  Sunday, April 9, 2017 02:27 - CONCLUSION:  Left 





 intertrochanteric femoral neck fracture.     John Dowling MD 


 


Femur X-Ray 4/9/17 0154 Signed





Impressions: 





 Service Date/Time:  Sunday, April 9, 2017 02:32 - CONCLUSION:  

Intertrochanteric 





 left femoral neck fracture.     John Dowling MD 


 


Chest X-Ray 4/9/17 0154 Signed





Impressions: 





 Service Date/Time:  Sunday, April 9, 2017 02:31 - CONCLUSION:  1. 

Postoperative 





 changes in the right chest which appear stable. 2. Increased density in the 





 medial left base representing either a consolidation or potentially a hiatal 





 hernia. There are some air lucency in this.     John Dowling MD 


 


Renal Ultrasound 4/9/17 0000 Signed





Impressions: 





 Service Date/Time:  Sunday, April 9, 2017 21:32 - CONCLUSION: Normal 





 examination.       Marcial Wells MD 


 


Hip X-Ray 4/9/17 0000 Signed





Impressions: 





 Service Date/Time:  Sunday, April 9, 2017 10:59 - CONCLUSION:  Status post 

ORIF 





 of a left intratrochanteric fracture with good anatomic alignment.     





 Susan Mathur MD 


 


Head CT 4/9/17 0000 Signed





Impressions: 





 Service Date/Time:  Sunday, April 9, 2017 02:56 - CONCLUSION:  1. No 





 intracranial abnormality is seen. 2. Right sinus disease.     John Dowling MD 


 


Cervical Spine CT 4/9/17 0000 Signed





Impressions: 





 Service Date/Time:  Sunday, April 9, 2017 02:56 - CONCLUSION:  Scattered 





 degenerative change as described above. An acute bony abnormality is not seen.

   





  John Dowling MD 


 


Abdomen/Pelvis CT 4/9/17 0000 Signed





Impressions: 





 Service Date/Time:  Sunday, April 9, 2017 03:28 - CONCLUSION:  1. Left 





 intertrochanteric femoral neck fracture. 2. Post operative changed from distal 





 esophagectomy and gastrectomy.     John Dowling MD 








Objective Remarks


GENERAL: NAD


SKIN: Warm and dry.


HEAD: Normocephalic.


EYES: No scleral icterus. No injection or drainage. 


NECK: Supple, trachea midline. No JVD or lymphadenopathy.


CARDIOVASCULAR: Regular rate and rhythm without murmurs, gallops, or rubs. 


RESPIRATORY: Breath sounds equal bilaterally. No accessory muscle use.


GASTROINTESTINAL: Abdomen soft, non-tender, nondistended. 


MUSCULOSKELETAL: No cyanosis, or edema. dressing over left hip incision


BACK: Nontender without obvious deformity. No CVA tenderness.





Procedures


Intramedullary nailing left intertrochanteric hip fracture.





A/P


Problem List:  


(1) Closed fracture of intertrochanteric section of femur


ICD Code:  S72.143A


Status:  Acute


(2) Closed intertrochanteric fracture of hip


ICD Code:  S72.143A


Status:  Acute


(3) CKD (chronic kidney disease) stage 4, GFR 15-29 ml/min


ICD Code:  N18.4


Status:  Acute


(4) Dementia


ICD Code:  F03.90


Status:  Acute


(5) Anxiety and depression


ICD Code:  F41.9


Status:  Acute


Assessment and Plan


66-year-old man with





Closed intertrochanteric fracture of the left hip: Pelvic and femur x-ray with 

finding of  Intertrochanteric left femoral neck fracture; s/p Intramedullary 

nailing left intertrochanteric hip fracture 04/09/17 and management per 

orthopedic surgery.  PT to treat and eval .Pain Management accordingly with 

when necessary analgesic/morphine





Chronic kidney disease stage IV: Renal indices improving BUn/Cr 24/1.54; renal 

ultrasound normal.  Continue with gentle IV fluid hydrations and monitor BUN 

and creatinine.  Avoid all nephrotoxic drugs.





Dementia: continue Namenda





Anxiety/depression: continue outpatient medications





DVT prophylaxis: Lovenox


Discharge Planning


Likely discharge 04/12/17 to SNF





Problem Qualifiers





(1) Closed intertrochanteric fracture of hip:  


Qualified Code:  S72.142A - Closed intertrochanteric fracture of hip, left, 

initial encounter


(2) Dementia:  


Qualified Code:  F03.91 - Dementia with behavioral disturbance, unspecified 

dementia type





Calixto Cárdenas MD Apr 11, 2017 10:53

## 2017-04-12 NOTE — HHI.DS
__________________________________________________





Discharge Summary


Admission Date


Apr 9, 2017 at 04:40


Discharge Date:  Apr 12, 2017


Admitting Diagnosis


L hip fracture; renal insufficiency; syncope





(1) Closed fracture of intertrochanteric section of femur


ICD Code:  S72.143A


(2) Closed intertrochanteric fracture of hip


ICD Code:  S72.143A


(3) CKD (chronic kidney disease) stage 4, GFR 15-29 ml/min


ICD Code:  N18.4


(4) Dementia


ICD Code:  F03.90


(5) Anxiety and depression


ICD Code:  F41.9


Procedures


Intramedullary nailing left intertrochanteric hip fracture.


Brief History - From Admission


66yrs old man with a PMH of Dementia, Anxiety/Depression and a resident of a 

Local group home was brought to the ED for evaluation of hip pain s/p 

mechanical fall which happened around 10PM last night when patient became upset 

with the staff as a wheelchair that was requested and was not available at the 

time; patient threw himself on the floor. Pelvic and femur x-ray revealed 

finding of  Intertrochanteric left femoral neck fracture for which Orthopedic 

surgery was consulted and patient taken to the OR today and underwent open 

reduction internal fixation.  Patient was seen in PACU, where he was stable


CBC/BMP:  


4/12/17 0617                                                                   

             4/12/17 0617





Significant Findings





Laboratory Tests








Test 4/10/17 4/11/17 4/12/17





 05:58 07:01 06:17


 


Red Blood Count 3.09 MIL/MM3 3.00 MIL/MM3 3.00 MIL/MM3





 (4.50-5.90) (4.50-5.90) (4.50-5.90)


 


Hemoglobin 9.4 GM/DL 8.8 GM/DL 9.0 GM/DL





 (13.0-17.0) (13.0-17.0) (13.0-17.0)


 


Hematocrit 27.4 % 27.3 % 26.9 %





 (39.0-51.0) (39.0-51.0) (39.0-51.0)


 


Platelet Count 93 TH/MM3 119 TH/MM3 





 (150-450) (150-450) 


 


Band Neutrophils % 19 % (0-6)  


 


Platelet Estimate LOW  (NORMAL)  


 


Blood Urea Nitrogen 36 MG/DL (7-18) 24 MG/DL (7-18) 26 MG/DL (7-18)


 


Creatinine 2.08 MG/DL 1.54 MG/DL 1.53 MG/DL





 (0.60-1.30) (0.60-1.30) (0.60-1.30)


 


Estimat Glomerular Filtration 32 ML/MIN (>89) 45 ML/MIN (>89) 46 ML/MIN (>89)





Rate   


 


Calcium Level 7.7 MG/DL 8.3 MG/DL 





 (8.5-10.1) (8.5-10.1) 


 


Aspartate Amino Transf 137 U/L (15-37)  





(AST/SGOT)   


 


Total Protein 5.3 GM/DL  





 (6.4-8.2)  


 


Albumin 2.4 GM/DL  





 (3.4-5.0)  


 


Random Glucose   121 MG/DL





   ()








Imaging





Last Impressions








Pelvis X-Ray 4/9/17 0154 Signed





Impressions: 





 Service Date/Time:  Sunday, April 9, 2017 02:27 - CONCLUSION:  Left 





 intertrochanteric femoral neck fracture.     John Dowling MD 


 


Femur X-Ray 4/9/17 0154 Signed





Impressions: 





 Service Date/Time:  Sunday, April 9, 2017 02:32 - CONCLUSION:  

Intertrochanteric 





 left femoral neck fracture.     John Dowling MD 


 


Chest X-Ray 4/9/17 0154 Signed





Impressions: 





 Service Date/Time:  Sunday, April 9, 2017 02:31 - CONCLUSION:  1. 

Postoperative 





 changes in the right chest which appear stable. 2. Increased density in the 





 medial left base representing either a consolidation or potentially a hiatal 





 hernia. There are some air lucency in this.     John Dowling MD 


 


Renal Ultrasound 4/9/17 0000 Signed





Impressions: 





 Service Date/Time:  Sunday, April 9, 2017 21:32 - CONCLUSION: Normal 





 examination.       Marcial Wells MD 


 


Hip X-Ray 4/9/17 0000 Signed





Impressions: 





 Service Date/Time:  Sunday, April 9, 2017 10:59 - CONCLUSION:  Status post 

ORIF 





 of a left intratrochanteric fracture with good anatomic alignment.     





 Susan Mathur MD 


 


Head CT 4/9/17 0000 Signed





Impressions: 





 Service Date/Time:  Sunday, April 9, 2017 02:56 - CONCLUSION:  1. No 





 intracranial abnormality is seen. 2. Right sinus disease.     John Dowling MD 


 


Cervical Spine CT 4/9/17 0000 Signed





Impressions: 





 Service Date/Time:  Sunday, April 9, 2017 02:56 - CONCLUSION:  Scattered 





 degenerative change as described above. An acute bony abnormality is not seen.

   





  John Dowling MD 


 


Abdomen/Pelvis CT 4/9/17 0000 Signed





Impressions: 





 Service Date/Time:  Sunday, April 9, 2017 03:28 - CONCLUSION:  1. Left 





 intertrochanteric femoral neck fracture. 2. Post operative changed from distal 





 esophagectomy and gastrectomy.     John Dowling MD 








PE at Discharge


GENERAL: NAD


SKIN: Warm and dry.


HEAD: Normocephalic.


EYES: No scleral icterus. No injection or drainage. 


NECK: Supple, trachea midline. No JVD or lymphadenopathy.


CARDIOVASCULAR: Regular rate and rhythm without murmurs, gallops, or rubs. 


RESPIRATORY: Breath sounds equal bilaterally. No accessory muscle use.


GASTROINTESTINAL: Abdomen soft, non-tender, nondistended. 


MUSCULOSKELETAL: No cyanosis, or edema. dressing over left hip incision


BACK: Nontender without obvious deformity. No CVA tenderness.





Hospital Course


Closed intertrochanteric fracture of the left hip: Pelvic and femur x-ray with 

finding of  Intertrochanteric left femoral neck fracture; s/p Intramedullary 

nailing left intertrochanteric hip fracture 04/09/17 and management per 

orthopedic surgery.  PT to treat and eval .Pain Management accordingly with 

when necessary analgesic/morphine





Chronic kidney disease stage IV: Renal indices improved with IVF ; renal 

ultrasound normal.  monitor BUN and creatinine.  Avoid all nephrotoxic drugs.





Dementia: Treated with Namenda





Anxiety/depression: outpatient medications were resumed





DVT prophylaxis: ASA on discharge


Pt Condition on Discharge:  Stable


Discharge Disposition:  Discharge to SNF


Discharge Time:  > 30 minutes


Discharge Instructions


DIET: Follow Instructions for:  Heart Healthy Diet


Activities you can perform:  Partial Weight Bearing


Follow up Referrals:  


Orthopedics


PCP Follow-up - 2-3 Days





New Medications:  


Aspirin (Aspirin Low Dose) 81 Mg Chew


81 MG CHEW DAILY Prevent Blood Clot #30 Ref 0 TAB


Hydrocodone-Acetaminophen (Norco) 7.5-325 mg Tab


1 TAB PO Q6H PRN PAIN #60 Ref 0 TAB


Sennosides-Docusate Sodium (Ilsa-Colace) 8.6-50 Mg Tab


1 TAB PO BID PRN Constipation #20 Ref 0 TAB


 


Continued Medications:  


Aripiprazole (Abilify) 2 Mg Tab


2 MG PO DAILY@0600 #30 Ref 3 TAB


Bupropion HCl ER 12 HR (Wellbutrin SR 12 HR) 150 Mg Tab


150 MG PO 1qam,1q4pm Control Depression #60 Ref 3 TAB


Memantine (Namenda) 10 Mg Tab


10 MG PO BID Alzheimer Disease #60 Ref 3 TAB


Trazodone (Trazodone) 50 Mg Tab


50 MG PO HS Insomnia #30 Ref 3 TAB


Venlafaxine ER 24 HR (Venlafaxine ER 24 HR) 150 Mg Cap


150 MG PO HS #30 Ref 4 CAP


 


Discontinued Medications:  


Loperamide (Loperamide) 2 Mg Cap


2 MG PO AS DIRECTED One capsule after each loose stool. Not to exceed 8 

capsules per day. PRN DIARRHEA Ref 0 CAP











Calixto Cárdenas MD Apr 12, 2017 10:34

## 2017-04-12 NOTE — PD.ORT.PN
Subjective


Post Op Day #:  3


Subjective Remarks


pain under control.





Objective


Vitals





 Vital Signs








  Date Time  Temp Pulse Resp B/P Pulse Ox O2 Delivery O2 Flow Rate FiO2


 


4/12/17 07:15      Room Air  


 


4/12/17 00:10 98.1 89 16 94/71 95   


 


4/11/17 21:48     97   21


 


4/11/17 19:00 97.9 96 16 92/66 97   


 


4/11/17 15:30 96.5 95 16 107/79 96   


 


4/11/17 13:51   18     


 


4/11/17 12:00 96.9 84 18 107/69 98   


 


4/11/17 08:00 97.4 80 18 111/72 98   








 I/O








 4/11/17 4/11/17 4/11/17 4/12/17 4/12/17 4/12/17





 07:00 15:00 23:00 07:00 15:00 23:00


 


Intake Total 120 ml  600 ml 120 ml  


 


Output Total 3 ml     


 


Balance 117 ml  600 ml 120 ml  


 


      


 


Intake Oral 120 ml  600 ml 120 ml  


 


Output Urine Total 3 ml     


 


# Voids   2 1  


 


# Bowel Movements 0  0 0  








Result Diagram:  


4/12/17 0617 4/12/17 0617





Objective Remarks


in chair eating, nad


incision no erythema, no drainage


neg homans


nvi





Assessment & Plan


Ortho Post Op Day #:  3


Problem List:  


Assessment and Plan


s/p L troch nail


50% PWB


daily dressing changes


lovenox


d/c planning


ortho stable - cleared for d/c


f/up dr. ozuna 2 weeks








Anshu Ruiz Apr 12, 2017 07:54

## 2017-04-12 NOTE — HHI.PR
Subjective


Remarks


F-u left hip fx s/p repair


04/10/17-patient seen and examined; no acute event overnight. Pain to the left 

hip stable


04/11/17-patient seen and examined, no change and stable.  Pain to left hip 

well controlled.


04/12/17-patient seen and examined; stable and afebrile





Objective


Vitals





 Vital Signs








  Date Time  Temp Pulse Resp B/P Pulse Ox O2 Delivery O2 Flow Rate FiO2


 


4/12/17 08:00 96.5 101 16 91/58 97   


 


4/12/17 07:15      Room Air  


 


4/12/17 00:10 98.1 89 16 94/71 95   


 


4/11/17 21:48     97   21


 


4/11/17 19:00 97.9 96 16 92/66 97   


 


4/11/17 15:30 96.5 95 16 107/79 96   


 


4/11/17 13:51   18     


 


4/11/17 12:00 96.9 84 18 107/69 98   








 I/O








 4/11/17 4/11/17 4/11/17 4/12/17 4/12/17 4/12/17





 07:00 15:00 23:00 07:00 15:00 23:00


 


Intake Total 120 ml  600 ml 120 ml  


 


Output Total 3 ml     


 


Balance 117 ml  600 ml 120 ml  


 


      


 


Intake Oral 120 ml  600 ml 120 ml  


 


Output Urine Total 3 ml     


 


# Voids   2 1  


 


# Bowel Movements 0  0 0  








Result Diagram:  


4/12/17 0617 4/12/17 0617





Imaging





Last Impressions








Pelvis X-Ray 4/9/17 0154 Signed





Impressions: 





 Service Date/Time:  Sunday, April 9, 2017 02:27 - CONCLUSION:  Left 





 intertrochanteric femoral neck fracture.     John Dowling MD 


 


Femur X-Ray 4/9/17 0154 Signed





Impressions: 





 Service Date/Time:  Sunday, April 9, 2017 02:32 - CONCLUSION:  

Intertrochanteric 





 left femoral neck fracture.     John Dowling MD 


 


Chest X-Ray 4/9/17 0154 Signed





Impressions: 





 Service Date/Time:  Sunday, April 9, 2017 02:31 - CONCLUSION:  1. 

Postoperative 





 changes in the right chest which appear stable. 2. Increased density in the 





 medial left base representing either a consolidation or potentially a hiatal 





 hernia. There are some air lucency in this.     John Dowling MD 


 


Renal Ultrasound 4/9/17 0000 Signed





Impressions: 





 Service Date/Time:  Sunday, April 9, 2017 21:32 - CONCLUSION: Normal 





 examination.       Marcial Wells MD 


 


Hip X-Ray 4/9/17 0000 Signed





Impressions: 





 Service Date/Time:  Sunday, April 9, 2017 10:59 - CONCLUSION:  Status post 

ORIF 





 of a left intratrochanteric fracture with good anatomic alignment.     





 Susan Mathur MD 


 


Head CT 4/9/17 0000 Signed





Impressions: 





 Service Date/Time:  Sunday, April 9, 2017 02:56 - CONCLUSION:  1. No 





 intracranial abnormality is seen. 2. Right sinus disease.     John Dowling MD 


 


Cervical Spine CT 4/9/17 0000 Signed





Impressions: 





 Service Date/Time:  Sunday, April 9, 2017 02:56 - CONCLUSION:  Scattered 





 degenerative change as described above. An acute bony abnormality is not seen.

   





  John Dowling MD 


 


Abdomen/Pelvis CT 4/9/17 0000 Signed





Impressions: 





 Service Date/Time:  Sunday, April 9, 2017 03:28 - CONCLUSION:  1. Left 





 intertrochanteric femoral neck fracture. 2. Post operative changed from distal 





 esophagectomy and gastrectomy.     John Dowling MD 








Objective Remarks


GENERAL: NAD


SKIN: Warm and dry.


HEAD: Normocephalic.


EYES: No scleral icterus. No injection or drainage. 


NECK: Supple, trachea midline. No JVD or lymphadenopathy.


CARDIOVASCULAR: Regular rate and rhythm without murmurs, gallops, or rubs. 


RESPIRATORY: Breath sounds equal bilaterally. No accessory muscle use.


GASTROINTESTINAL: Abdomen soft, non-tender, nondistended. 


MUSCULOSKELETAL: No cyanosis, or edema. dressing over left hip incision


BACK: Nontender without obvious deformity. No CVA tenderness.





Procedures


Intramedullary nailing left intertrochanteric hip fracture.





A/P


Problem List:  


(1) Closed fracture of intertrochanteric section of femur


ICD Code:  S72.143A


Status:  Acute


(2) Closed intertrochanteric fracture of hip


ICD Code:  S72.143A


Status:  Acute


(3) CKD (chronic kidney disease) stage 4, GFR 15-29 ml/min


ICD Code:  N18.4


Status:  Acute


(4) Dementia


ICD Code:  F03.90


Status:  Acute


(5) Anxiety and depression


ICD Code:  F41.9


Status:  Acute


Assessment and Plan


66-year-old man with





Closed intertrochanteric fracture of the left hip: Pelvic and femur x-ray with 

finding of  Intertrochanteric left femoral neck fracture; s/p Intramedullary 

nailing left intertrochanteric hip fracture 04/09/17 and management per 

orthopedic surgery.  PT to treat and eval .Pain Management accordingly with 

when necessary analgesic/morphine





Chronic kidney disease stage IV: Renal indices improved with IVF ; renal 

ultrasound normal.  monitor BUN and creatinine.  Avoid all nephrotoxic drugs.





Dementia: continue Namenda





Anxiety/depression: continue outpatient medications





DVT prophylaxis: ASA on discharge


Discharge Planning


discharge to SNF





Problem Qualifiers





(1) Closed intertrochanteric fracture of hip:  


Qualified Code:  S72.142A - Closed intertrochanteric fracture of hip, left, 

initial encounter





Calixto Cárdenas MD Apr 12, 2017 10:32

## 2017-09-01 NOTE — PD
HPI


Chief Complaint:  Abdominal Pain


Time Seen by Provider:  20:18


Travel History


International Travel<30 days:  No


Contact w/Intl Traveler<30days:  No


Traveled to known affect area:  No





History of Present Illness


HPI


66-year-old male arrives by EMS from assisted living facility.  He suffers with 

dementia.  He has a history of partial gastrectomy due to esophageal and 

gastric cancer.  Evidently imaging, plain film, performed yesterday revealed 

ileus and he was sent to the ER for rule out bowel obstruction.  Due to 

nonverbal state history limited at time of ER arrival.  History obtained by EMS 

and with [facility] paperwork.





PFSH


Past Medical History


Anxiety:  Yes


Depression:  Yes


Cancer:  Yes (esophageal and gastric)


Dementia:  Yes





Past Surgical History


Abdominal Surgery:  Yes


Pacemaker:  No


Other Surgery:  Yes (partial gastrectomy)





Social History


Alcohol Use:  No


Tobacco Use:  No


Substance Use:  No





Allergies-Medications


(Allergen,Severity, Reaction):  


Coded Allergies:  


     Sulfa (Sulfonamide Antibiotics) (Unverified  Allergy, Unknown, 9/1/17)


     penicillin G (Unverified  Allergy, Unknown, 9/1/17)


     prochlorperazine (Unverified  Allergy, Unknown, 9/1/17)


Reported Meds & Prescriptions





Reported Meds & Active Scripts


Active


Ilsa-Colace (Sennosides-Docusate Sodium) 8.6-50 Mg Tab 1 Tab PO BID PRN


Aspirin Low Dose (Aspirin) 81 Mg Chew 81 Mg CHEW DAILY


Norco (Hydrocodone-Acetaminophen) 7.5-325 mg Tab 1 Tab PO Q6H PRN


Abilify (Aripiprazole) 2 Mg Tab 2 Mg PO DAILY@0600


Wellbutrin SR 12 HR (Bupropion HCl) 150 Mg Tab 150 Mg PO 1QAM,1Q4PM


Trazodone (Trazodone HCl) 50 Mg Tab 50 Mg PO HS


Namenda (Memantine) 10 Mg Tab 10 Mg PO BID


Venlafaxine ER 24 HR (Venlafaxine HCl) 150 Mg Cap 150 Mg PO HS


Reported


Vitamin D3 (Cholecalciferol) 1,000 Unit Cap 1,000 Units PO DAILY


Integra (Multi-Vit/Iron-B Comp-Vit C) Unknown Strength Cap Unknown Dose  








Review of Systems


ROS Limitations:  Clinical Condition





Physical Exam


Narrative


GENERAL: Well-nourished well-developed 66-year-old male no acute distress


SKIN: Warm and dry.


HEAD: Atraumatic. Normocephalic. 


EYES: Pupils equal and round. No scleral icterus. No injection or drainage. 


ENT: No nasal bleeding or discharge.  Mucous membranes pink and moist.


NECK: Trachea midline. No JVD. 


CARDIOVASCULAR: Regular rate and rhythm.  


RESPIRATORY: No accessory muscle use. Clear to auscultation. Breath sounds 

equal bilaterally. 


GASTROINTESTINAL: soft.  Minimal tenderness palpation left lower quadrant.


MUSCULOSKELETAL: Extremities without clubbing, cyanosis, or edema. No obvious 

deformities. 


NEUROLOGICAL: Cranial nerves II through XII normal.  Moving all extremities 

normally.  Patient makes good eye contact however is nonverbal.


PSYCHIATRIC: Normal seasonal attire. Well nourished.





Data


Data


Last Documented VS





Vital Signs








  Date Time  Temp Pulse Resp B/P (MAP) Pulse Ox O2 Delivery O2 Flow Rate FiO2


 


9/1/17 20:17 98.4 86 16 141/86 (104) 97   





VS reviewed


Orders





 Orders


Complete Blood Count With Diff (9/1/17 19:49)


Comprehensive Metabolic Panel (9/1/17 19:49)


Prothrombin Time / Inr (Pt) (9/1/17 19:49)


Urinalysis - C+S If Indicated (9/1/17 19:49)


Ct Abd/Pel W/O Iv Contrast (9/1/17 20:18)


Admit Order (Ed Use Only) (9/1/17 23:41)





Labs





Laboratory Tests








Test


  9/1/17


19:50


 


White Blood Count 8.5 TH/MM3 


 


Red Blood Count 4.51 MIL/MM3 


 


Hemoglobin 12.5 GM/DL 


 


Hematocrit 39.1 % 


 


Mean Corpuscular Volume 86.8 FL 


 


Mean Corpuscular Hemoglobin 27.7 PG 


 


Mean Corpuscular Hemoglobin


Concent 32.0 % 


 


 


Red Cell Distribution Width 16.0 % 


 


Platelet Count 271 TH/MM3 


 


Mean Platelet Volume 7.0 FL 


 


Neutrophils (%) (Auto) 64.3 % 


 


Lymphocytes (%) (Auto) 16.8 % 


 


Monocytes (%) (Auto) 16.0 % 


 


Eosinophils (%) (Auto) 1.9 % 


 


Basophils (%) (Auto) 1.0 % 


 


Neutrophils # (Auto) 5.5 TH/MM3 


 


Lymphocytes # (Auto) 1.4 TH/MM3 


 


Monocytes # (Auto) 1.4 TH/MM3 


 


Eosinophils # (Auto) 0.2 TH/MM3 


 


Basophils # (Auto) 0.1 TH/MM3 


 


CBC Comment DIFF FINAL 


 


Differential Comment  


 


Prothrombin Time 10.4 SEC 


 


Prothromb Time International


Ratio 0.9 RATIO 


 


 


Blood Urea Nitrogen 23 MG/DL 


 


Creatinine 1.84 MG/DL 


 


Random Glucose 104 MG/DL 


 


Total Protein 7.4 GM/DL 


 


Albumin 3.0 GM/DL 


 


Calcium Level 8.8 MG/DL 


 


Alkaline Phosphatase 165 U/L 


 


Aspartate Amino Transf


(AST/SGOT) 34 U/L 


 


 


Alanine Aminotransferase


(ALT/SGPT) 23 U/L 


 


 


Total Bilirubin 0.3 MG/DL 


 


Sodium Level 138 MEQ/L 


 


Potassium Level 4.6 MEQ/L 


 


Chloride Level 104 MEQ/L 


 


Carbon Dioxide Level 26.9 MEQ/L 


 


Anion Gap 7 MEQ/L 


 


Estimat Glomerular Filtration


Rate 37 ML/MIN 


 











MDM


Medical Decision Making


Medical Screen Exam Complete:  Yes


Emergency Medical Condition:  Yes


Medical Record Reviewed:  Yes


Differential Diagnosis


Constipation, Gastritis, Acute Cholecystitis, Biliary Colic, Pancreatitis, HUNT

, Hepatitis, Bowel Obstruction, Cystitis, Mesenteric Ischemia, AAA, Appendicitis

, Renal Stone/Hydronephrosis, GERD, perforated viscous


Narrative Course


CBC & BMP Diagram


9/1/17 19:50








Total Protein 7.4, Albumin 3.0 L, Calcium Level 8.8, Alkaline Phosphatase 165 H

, Aspartate Amino Transf (AST/SGOT) 34, Alanine Aminotransferase (ALT/SGPT) 23, 

Total Bilirubin 0.3








Last 24 hours Impressions








Abdomen/Pelvis CT 9/1/17 2018 Signed





Impressions: 





 Service Date/Time:  Friday, September 1, 2017 21:57 - CONCLUSION:  1. Negative 





 for bowel obstruction. 2. Constipation, especially in the rectum which is 





 distended to 8.5 cm. 3. Previous distal esophagectomy and gastric pull-through 





 procedure with herniation of colon and to the lower left chest.     Marcial Wells MD 





d/w Dr Jasso for GI, ok for colonoscopy disimpaction tomorrow 


d/w Dr Salazar for [facility]





Diagnosis





 Primary Impression:  


 Fecal impaction in rectum





Admitting Information


Admitting Physician Requests:  Observation











Behzad De Guzman MD Sep 1, 2017 22:12

## 2017-09-01 NOTE — RADRPT
EXAM DATE/TIME:  09/01/2017 21:57 

 

[facility] COMPARISON:     

No previous studies available for comparison.

 

 

INDICATIONS :     

Abdomen pain. Possible obstruction.

                  

 

ORAL CONTRAST:      

No oral contrast ingested.

                  

 

RADIATION DOSE:     

4.67 CTDIvol (mGy) 

 

 

MEDICAL HISTORY :     

Dementia. Carcinoma, esophageal. 

 

SURGICAL HISTORY :       

Partial gastrectomy

 

ENCOUNTER:      

Initial

 

ACUITY:      

1 day

 

PAIN SCALE:      

5/10

 

LOCATION:       

Bilateral  abdomen

 

TECHNIQUE:     

Volumetric scanning of the abdomen and pelvis was performed.  Using automated exposure control and ad
justment of the mA and/or kV according to patient size, radiation dose was kept as low as reasonably 
achievable to obtain optimal diagnostic quality images.  DICOM format image data is available electro
nically for review and comparison.  

 

FINDINGS:     

Lung bases demonstrate postop changes of distal esophagectomy and gastric pull-through procedure. The
re is some herniation of colon into the lower left chest. Lung bases otherwise clear.

 

No acute findings in the liver, spleen, adrenals, kidneys or pancreas. No bowel obstruction. There is
 severe rectal constipation with rectum dilated to about 9.5 cm. Moderate constipation in the remaind
er of the colon. No acute bony abnormality.

 

CONCLUSION:     

1. Negative for bowel obstruction.

2. Constipation, especially in the rectum which is distended to 8.5 cm.

3. Previous distal esophagectomy and gastric pull-through procedure with herniation of colon and to t
he lower left chest.

 

 

 

 Marcial Wells MD on September 01, 2017 at 23:07           

Board Certified Radiologist.

 This report was verified electronically.

## 2017-09-01 NOTE — HHI.HP
__________________________________________________





[facility]


Service


[facility]


Primary Care Physician


Clive Caldwell MD


Admission Diagnosis





Fecal Impaction


Diagnoses:  


(1) Fecal impaction in rectum


Diagnosis:  Principal





(2) Renal insufficiency


Diagnosis:  Principal





(3) Dementia


Diagnosis:  Principal





Travel History


International Travel<30 Days:  No


Contact w/Intl Traveler <30 Da:  No


Traveled to Known Affected Are:  No


History of Present Illness


This is a 66-year-old male with a PMH of Dementia, Anxiety, Depression and 

Esophageal/Gastric CA s/p Gastrectomy who was sent to the ER from [facility] for eval of possible SBO.  Pt had Abd X-ray done as outpatient 

yesterday which revealed Ileus, sent to ER for possible SBO, no BM for 4-6 days 

per records.  Unable to obtain history from patient due to severe dementia.  On 

arrival, /86, HR 86, O2 sat 97% on RA, Afebrile.  CBC essentially 

unremarkable.  Creatinine 1.84, previously 1.53 on 17.  INR 0.9.  CT Abd/

Pelvis negative for bowel obstruction, constipation especially in rectum which 

is distended to 8.5cm, unable to manually disimpact by ER physician.  GI 

Consulted by ER physician, plan is for Colonoscopy for disimpaction in am.





Review of Systems


ROS: Unable to obtain.





Past Family Social History


Past Medical History


PMH:  Dementia, Anxiety, Depression and Esophageal/Gastric CA s/p Gastrectomy


Past Surgical History


PAST SURGICAL HISTORY:  Partial Gastrectomy


Allergies:  


Coded Allergies:  


     Sulfa (Sulfonamide Antibiotics) (Unverified  Allergy, Unknown, 17)


     penicillin G (Unverified  Allergy, Unknown, 17)


     prochlorperazine (Unverified  Allergy, Unknown, 17)


Family History


PAST FAMILY HISTORY:  Reviewed.  No h/o DM or CAD


Social History


PAST SOCIAL HISTORY:  Negative for alcohol, tobacco or drugs.





Physical Exam


Vital Signs





Vital Signs








  Date Time  Temp Pulse Resp B/P (MAP) Pulse Ox O2 Delivery O2 Flow Rate FiO2


 


17 20:17 98.4 86 16 141/86 (104) 97   








Physical Exam


PE:


GENERAL: Middle-aged white male in no acute distress.


HEENT: PERRLA, EOMI. No scleral icterus or conjunctival pallor. No lid lag or 

facial droop.  


CARDIOVASCULAR: Regular rate and rhythm.  No obvious murmurs to auscultation. 

No chest tenderness to palpation. 


RESPIRATORY: No obvious rhonchi or wheezing. Clear to auscultation. Breath 

sounds equal bilaterally. 


GASTROINTESTINAL: Abdomen soft, non-tender, nondistended. BS normal. 


MUSCULOSKELETAL: Extremities without clubbing, cyanosis, or edema. No obvious 

deformities. 


NEUROLOGICAL: Awake, alert. Nonverbal, at baseline. No focal neurologic 

deficits. Moving both upper and lower extremities spontaneously.


Laboratory





Laboratory Tests








Test


  17


19:50


 


White Blood Count 8.5 


 


Red Blood Count 4.51 


 


Hemoglobin 12.5 


 


Hematocrit 39.1 


 


Mean Corpuscular Volume 86.8 


 


Mean Corpuscular Hemoglobin 27.7 


 


Mean Corpuscular Hemoglobin


Concent 32.0 


 


 


Red Cell Distribution Width 16.0 


 


Platelet Count 271 


 


Mean Platelet Volume 7.0 


 


Neutrophils (%) (Auto) 64.3 


 


Lymphocytes (%) (Auto) 16.8 


 


Monocytes (%) (Auto) 16.0 


 


Eosinophils (%) (Auto) 1.9 


 


Basophils (%) (Auto) 1.0 


 


Neutrophils # (Auto) 5.5 


 


Lymphocytes # (Auto) 1.4 


 


Monocytes # (Auto) 1.4 


 


Eosinophils # (Auto) 0.2 


 


Basophils # (Auto) 0.1 


 


CBC Comment DIFF FINAL 


 


Differential Comment  


 


Prothrombin Time 10.4 


 


Prothromb Time International


Ratio 0.9 


 


 


Blood Urea Nitrogen 23 


 


Creatinine 1.84 


 


Random Glucose 104 


 


Total Protein 7.4 


 


Albumin 3.0 


 


Calcium Level 8.8 


 


Alkaline Phosphatase 165 


 


Aspartate Amino Transf


(AST/SGOT) 34 


 


 


Alanine Aminotransferase


(ALT/SGPT) 23 


 


 


Total Bilirubin 0.3 


 


Sodium Level 138 


 


Potassium Level 4.6 


 


Chloride Level 104 


 


Carbon Dioxide Level 26.9 


 


Anion Gap 7 


 


Estimat Glomerular Filtration


Rate 37 


 








Result Diagram:  


17 1950                                                                    

            17








Caprini VTE Risk Assessment


Caprini VTE Risk Assessment:  No/Low Risk (score <= 1)


Caprini Risk Assessment Model











 Point Value = 1          Point Value = 2  Point Value = 3  Point Value = 5


 


Age 41-60


Minor surgery


BMI > 25 kg/m2


Swollen legs


Varicose veins


Pregnancy or postpartum


History of unexplained or recurrent


   spontaneous 


Oral contraceptives or hormone


   replacement


Sepsis (< 1 month)


Serious lung disease, including


   pneumonia (< 1 month)


Abnormal pulmonary function


Acute myocardial infarction


Congestive heart failure (< 1 month)


History of inflammatory bowel disease


Medical patient at bed rest Age 61-74


Arthroscopic surgery


Major open surgery (> 45 min)


Laparoscopic surgery (> 45 min)


Malignancy


Confined to bed (> 72 hours)


Immobilizing plaster cast


Central venous access Age >= 75


History of VTE


Family history of VTE


Factor V Leiden


Prothrombin 01620I


Lupus anticoagulant


Anticardiolipin antibodies


Elevated serum homocysteine


Heparin-induced thrombocytopenia


Other congenital or acquired


   thrombophilia Stroke (< 1 month)


Elective arthroplasty


Hip, pelvis, or leg fracture


Acute spinal cord injury (< 1 month)








Prophylaxis Regimen











   Total Risk


Factor Score Risk Level Prophylaxis Regimen


 


0-1      Low Early ambulation


 


2 Moderate Order ONE of the following:


*Sequential Compression Device (SCD)


*Heparin 5000 units SQ BID


 


3-4 Higher Order ONE of the following medications:


*Heparin 5000 units SQ TID


*Enoxaparin/Lovenox 40 mg SQ daily (WT < 150 kg, CrCl > 30 mL/min)


*Enoxaparin/Lovenox 30 mg SQ daily (WT < 150 kg, CrCl > 10-29 mL/min)


*Enoxaparin/Lovenox 30 mg SQ BID (WT < 150 kg, CrCl > 30 mL/min)


AND/OR


*Sequential Compression Device (SCD)


 


5 or more Highest Order ONE of the following medications:


*Heparin 5000 units SQ TID (Preferred with Epidurals)


*Enoxaparin/Lovenox 40 mg SQ daily (WT < 150 kg, CrCl > 30 mL/min)


*Enoxaparin/Lovenox 30 mg SQ daily (WT < 150 kg, CrCl > 10-29 mL/min)


*Enoxaparin/Lovenox 30 mg SQ BID (WT < 150 kg, CrCl > 30 mL/min)


AND


*Sequential Compression Device (SCD)











Assessment and Plan


Problem List:  


(1) Fecal impaction in rectum


ICD Code:  K56.41 - Fecal impaction


(2) Renal insufficiency


ICD Code:  N28.9 - Disorder of kidney and ureter, unspecified


Status:  Acute


(3) Dementia


ICD Code:  F03.90 - Unspecified dementia without behavioral disturbance


Status:  Acute


Assessment and Plan


A/P:


1.  Fecal Impaction:  Rectal.  Outpatient X-ray w/ evidence of Ileus, sent to 

ER from Rehab for concern for bowel obstruction.  CT Abd/Pelvis negative for 

bowel obstruction, +constipation w/ rectum distended to 8.5cm, images reviewed 

by me.  GI consulted by ER physician, plan is for colonoscopy for disimpaction 

in am.  NPO, IVF, analgesics/antiemetics as needed. 


2.  Renal Insufficiency:  Acute on Chronic.  Creatinine 1.84, previously 1.53 

on 17.  IVF for hydration, repeat labs in am. 


3.  Dementia:  Severe.  At baseline.  Resume home medications post-procedure. 


4.  DVT Prophylaxis:  SCD/Teds. 


5.  Social work for d/c planning as needed. 


6.  Case discussed w/ ER physician at length











Noreen Salazar MD Sep 1, 2017 23:43

## 2017-09-02 NOTE — HHI.PR
Subjective


Remarks


Follow-up for stool impaction.  The patient states he is doing well today.  

When asked about his abdomen he states it's "not too good".  When asked to 

point to the pain he points to the left lower quadrant.  He is happy that her 

gastroenterologist is coming to help the pain.





Objective


Vitals





Vital Signs








  Date Time  Temp Pulse Resp B/P (MAP) Pulse Ox O2 Delivery O2 Flow Rate FiO2


 


9/2/17 08:35 97.9 84 20 124/86 (99) 96   


 


9/2/17 01:46 98.1 84 18 127/80 (96) 98   


 


9/2/17 00:49        


 


9/2/17 00:48  84 16 142/78 (99) 99 Room Air  


 


9/1/17 20:17 98.4 86 16 141/86 (104) 97   














I/O      


 


 9/1/17 9/1/17 9/1/17 9/2/17 9/2/17 9/2/17





 07:00 15:00 23:00 07:00 15:00 23:00


 


Intake Total    200 ml 200 ml 


 


Balance    200 ml 200 ml 


 


      


 


Intake Oral    200 ml 200 ml 








Result Diagram:  


9/2/17 0750                                                                    

            9/2/17 0750





Imaging





Last Impressions








Abdomen/Pelvis CT 9/1/17 2018 Signed





Impressions: 





 Service Date/Time:  Friday, September 1, 2017 21:57 - CONCLUSION:  1. Negative 





 for bowel obstruction. 2. Constipation, especially in the rectum which is 





 distended to 8.5 cm. 3. Previous distal esophagectomy and gastric pull-through 





 procedure with herniation of colon and to the lower left chest.     Marcial Wells MD 








Objective Remarks


GENERAL: Well-developed well-nourished.  In no acute distress.


SKIN: Warm and dry. No lesions noted.


HEENT: Normocephalic. Pupils equal and round. Mucous membranes pink and moist. 


CARDIOVASCULAR: Regular rate and rhythm.  No murmur appreciated.


RESPIRATORY: No accessory muscle use. Clear to auscultation. Breath sounds 

equal bilaterally. 


GASTROINTESTINAL: Abdomen soft, mild LLQ TTP, nondistended.  Decreased bowel 

sounds.


MUSCULOSKELETAL: No obvious deformities. No clubbing or cyanosis. No edema. 


NEUROLOGICAL: Awake and alert. No focal neurological deficits.  Moves upper and 

lower extremities spontaneously. Normal speech.


PSYCHIATRIC: Pleasantly confused mood and affect; insight and judgment limited.





A/P


Problem List:  


(1) Fecal impaction in rectum


ICD Code:  K56.41 - Fecal impaction


Status:  Acute


(2) Renal insufficiency


ICD Code:  N28.9 - Disorder of kidney and ureter, unspecified


Status:  Chronic


(3) Dementia


ICD Code:  F03.90 - Unspecified dementia without behavioral disturbance


Status:  Chronic


Assessment and Plan


66-year-old male with a PMH of Dementia, Anxiety, Depression and Esophageal/

Gastric CA s/p Gastrectomy who was sent from [facility] for eval 

of possible SBO





Fecal Impaction:  Rectal.  Outpatient X-ray w/ evidence of Ileus, sent to ER 

from Rehab for concern for bowel obstruction.  CT Abd/Pelvis negative for bowel 

obstruction, +constipation w/ rectum distended to 8.5cm, images reviewed by me.

  GI consulted by ER physician, plan is for colonoscopy for disimpaction today.

  NPO, IVF, analgesics/antiemetics as needed. 





HARRY on CKD: Creatinine 1.84, previously 1.53 on 4/12/17.  Creatinine improving 

to 1.72 with IVF, continue.





Dementia:  Severe.  At baseline.  Resume home medications post-procedure. 





DVT Prophylaxis:  SCD/Teds.


Discharge Planning


Follow-up GI recommendations after colonoscopy.











Jalil Sebastian Sep 2, 2017 09:36

## 2017-09-02 NOTE — MB
cc:

JEANNETTE MONTALVO MD

****

 

 

DATE OF CONSULTATION

9/2/17

 

REASON FOR CONSULTATION

Large hiatal hernia after previous esophagectomy.

 

REQUESTING PHYSICIAN

Dr. Salazar

 

HISTORY OF PRESENT ILLNESS

The patient is a 56-year-old male with a history of severe dementia and

esophageal cancer status post esophagectomy and gastric pull-through who was

admitted to [facility] for abdominal pain.  The patient underwent a

CT scan which did show a large amount of constipation and stool throughout the

colon as well as a large redundant colon.  A small area of the transverse colon

herniated into the left chest.  There  was no   acute inflammation or signs of

obstruction at this point and this was likely a chronically incarcerated

incidental finding.  General surgery is asked to evaluate the  patient.  The

patient does complain of a left lower quadrant pain, denies any chest pain,

shortness of breath.  He does  also complain of constipation.  No nausea,

vomiting.  The patient does have limited history other than immediate symptoms,

has the history of dementia.  A lot of the  history is obtained from record.

 

REVIEW OF SYSTEMS

Conducted review of systems  with the patient and again are negative, although

this is  limited due to the patient's dementia.

 

PAST MEDICAL HISTORY

1.  Dementia,

2.  Anxiety, depression,

3.  Esophageal cancer.

 

PAST SURGICAL HISTORY

Esophagectomy Don Nishant type

 

ALLERGIES

SULFA

PENICILLIN

PROCHLOPERAZINE

 

 

MEDICATIONS

1.  Abilify.

2.  Namenda

3.  Desyrel

4.  Effexor.

5.  Wellbutrin.

6.  MiraLax.

7.  Zofran.

8.  Tylenol.

9.  Morphine.

 

FAMILY HISTORY

Unable to obtain

 

SOCIAL HISTORY

Negative for alcohol, tobacco or drug use per the record.

 

PHYSICAL EXAMINATION

VITAL SIGNS:  Temperature 97.9 degrees, heart rate 84.  Blood pressure 132/97,

O2 saturation 96%.  The patient in a thin,  male in no acute distress.

 

HEENT:  Normocephalic atraumatic.  Pupils round, react and accommodate to

light.  Sclerae  anicteric.  Mucous membranes are moist.

NECK:  Supple.  No JVD.

LUNGS:  Clear to auscultation bilaterally.  Nonlabored breathing pattern.

HEART: Regular rate and rhythm.

ABDOMEN:   Soft, nondistended, nontender to palpation.  Surgical scars

consistent with an open Don Nishant are  well-healed and noted.  Previous

radiation tattoos are noted as well.

EXTREMITIES:  No clubbing, cyanosis or edema.

NEUROLOGIC:  The patient is awake, alert, oriented to self only moving all

extremities grossly equally.

 

ASSESSMENT/PLAN

The patient is a  66-year-old male with history of previous Don Nishant  with

incidental finding likely of  transverse colon through his diaphragmatic defect

from the gastric pull-through.  I feel his symptoms are likely just

constipation as the patient has severe constipation throughout his colon which

is also very redundant.  I agree with current management and recommendations by

GI completely. I do feel that this medical management  is appropriate for him.

If there is no signs of any acute incarceration,  obstruction or complications

from this hernia at this time and due to the patient's other comorbidities, I

would advise watchful waiting and treat this expectantly. The patient  can

follow with me on a p.r.n. basis.  However,  there is no surgical intervention

needed for this and once the patient's constipation is appropriately medically

managed, could likely be discharged.  Thank you very much fir this

consultation. Please call if needed. I will sign off.

 

 

 

                              _________________________________

                              MD COLBY Woods/

D:  9/2/2017/7:59 PM

T:  9/2/2017/8:49 PM

Visit #:  [id number]

Job #:  [id number]

## 2017-09-02 NOTE — PD.CONS
HPI


History of Present Illness


This is a 66 year old male patient with dementia who came to ED because of 

possible ileus.  He has history of esophageal/gastric cancer and had gastrectomy

/esophagectomy with pull up anastomosis.  He has not moved bowels in 6 days.  

He complains of pain in the left upper quadrant.  He has not been vomiting.  He 

had CT scan yesterday that showed constipation with large fecal stool in the 

rectal vault.  ED physician could not disimpact.  Also on CT there was a large 

hernia with colon in the left chest.  Pt cannot give ros because of his reduced 

mental capacity.  He does indicate he likes gatorade.





PFSH


Past Medical History


PMH:  Dementia, Anxiety, Depression and Esophageal/Gastric CA s/p Gastrectomy


Past Surgical History


PAST SURGICAL HISTORY:  Partial Gastrectomy


Coded Allergies:  


     Sulfa (Sulfonamide Antibiotics) (Unverified  Allergy, Unknown, 9/1/17)


     penicillin G (Unverified  Allergy, Unknown, 9/1/17)


     prochlorperazine (Unverified  Allergy, Unknown, 9/1/17)


Medications





Current Medications








 Medications


  (Trade)  Dose


 Ordered  Sig/Mary


 Route  Start Time


 Stop Time Status Last Admin


 


 Sodium Chloride  1,000 ml @ 


 100 mls/hr  Q10H


 IV  9/1/17 23:43


    9/2/17 09:56


 


 


  (NS Flush)  2 ml  UNSCH  PRN


 IV FLUSH  9/1/17 23:45


     


 


 


  (NS Flush)  2 ml  BID


 IV FLUSH  9/2/17 09:00


    9/2/17 09:56


 


 


  (Zofran Inj)  4 mg  Q6H  PRN


 IVP  9/1/17 23:45


     


 


 


  (Tylenol)  650 mg  Q6H  PRN


 PO  9/1/17 23:45


     


 


 


  (Morphine Inj)  1 mg  Q3H  PRN


 IV  9/1/17 23:45


     


 


 


  (Morphine Inj)  2 mg  Q3H  PRN


 IV  9/1/17 23:45


     


 


 


  (Ilsa-Colace)  1 tab  BID


 PO  9/2/17 09:00


    9/2/17 09:55


 


 


  (Milk Of


 Magnesia Liq)  30 ml  Q12H  PRN


 PO  9/1/17 23:45


    9/2/17 01:52


 


 


  (Senokot)  17.2 mg  Q12H  PRN


 PO  9/1/17 23:45


     


 


 


  (Dulcolax Supp)  10 mg  DAILY  PRN


 RECTAL  9/1/17 23:45


     


 


 


  (Lactulose Liq)  30 ml  DAILY  PRN


 PO  9/1/17 23:45


    9/2/17 01:52


 


 


  (Abilify)  2 mg  DAILY@0600


 PO  9/3/17 06:00


     


 


 


  (Wellbutrin Sr)  150 mg  BID@0900,1600


 PO  9/2/17 16:00


     


 


 


  (Namenda)  10 mg  BID


 PO  9/2/17 21:00


     


 


 


  (Desyrel)  50 mg  HS


 PO  9/2/17 21:00


     


 


 


  (Effexor Xr)  150 mg  HS


 PO  9/2/17 21:00


     


 








Family History


PAST FAMILY HISTORY:  Reviewed.  No h/o DM or CAD


Social History


PAST SOCIAL HISTORY:  Negative for alcohol, tobacco or drugs.





GI Exam


Vitals I&O





Vital Signs








  Date Time  Temp Pulse Resp B/P (MAP) Pulse Ox O2 Delivery O2 Flow Rate FiO2


 


9/2/17 08:35 97.9 84 20 124/86 (99) 96   


 


9/2/17 01:46 98.1 84 18 127/80 (96) 98   


 


9/2/17 00:49        


 


9/2/17 00:48  84 16 142/78 (99) 99 Room Air  


 


9/1/17 20:17 98.4 86 16 141/86 (104) 97   














I/O      


 


 9/1/17 9/1/17 9/1/17 9/2/17 9/2/17 9/2/17





 07:00 15:00 23:00 07:00 15:00 23:00


 


Intake Total    200 ml 200 ml 


 


Balance    200 ml 200 ml 


 


      


 


Intake Oral    200 ml 200 ml 








Laboratory











Test


  9/1/17


19:50 9/2/17


07:50


 


White Blood Count 8.5 TH/MM3  5.8 TH/MM3 


 


Red Blood Count 4.51 MIL/MM3  4.58 MIL/MM3 


 


Hemoglobin 12.5 GM/DL  13.1 GM/DL 


 


Hematocrit 39.1 %  39.9 % 


 


Mean Corpuscular Volume 86.8 FL  87.2 FL 


 


Mean Corpuscular Hemoglobin 27.7 PG  28.5 PG 


 


Mean Corpuscular Hemoglobin


Concent 32.0 % 


  32.7 % 


 


 


Red Cell Distribution Width 16.0 %  15.9 % 


 


Platelet Count 271 TH/MM3  229 TH/MM3 


 


Mean Platelet Volume 7.0 FL  7.1 FL 


 


Neutrophils (%) (Auto) 64.3 %  60.6 % 


 


Lymphocytes (%) (Auto) 16.8 %  21.2 % 


 


Monocytes (%) (Auto) 16.0 %  14.7 % 


 


Eosinophils (%) (Auto) 1.9 %  2.4 % 


 


Basophils (%) (Auto) 1.0 %  1.1 % 


 


Neutrophils # (Auto) 5.5 TH/MM3  3.5 TH/MM3 


 


Lymphocytes # (Auto) 1.4 TH/MM3  1.2 TH/MM3 


 


Monocytes # (Auto) 1.4 TH/MM3  0.9 TH/MM3 


 


Eosinophils # (Auto) 0.2 TH/MM3  0.1 TH/MM3 


 


Basophils # (Auto) 0.1 TH/MM3  0.1 TH/MM3 


 


CBC Comment DIFF FINAL  DIFF FINAL 


 


Differential Comment    


 


Prothrombin Time 10.4 SEC  


 


Prothromb Time International


Ratio 0.9 RATIO 


  


 


 


Blood Urea Nitrogen 23 MG/DL  18 MG/DL 


 


Creatinine 1.84 MG/DL  1.72 MG/DL 


 


Random Glucose 104 MG/DL  83 MG/DL 


 


Total Protein 7.4 GM/DL  6.9 GM/DL 


 


Albumin 3.0 GM/DL  3.0 GM/DL 


 


Calcium Level 8.8 MG/DL  8.9 MG/DL 


 


Alkaline Phosphatase 165 U/L  161 U/L 


 


Aspartate Amino Transf


(AST/SGOT) 34 U/L 


  17 U/L 


 


 


Alanine Aminotransferase


(ALT/SGPT) 23 U/L 


  21 U/L 


 


 


Total Bilirubin 0.3 MG/DL  0.2 MG/DL 


 


Sodium Level 138 MEQ/L  139 MEQ/L 


 


Potassium Level 4.6 MEQ/L  4.5 MEQ/L 


 


Chloride Level 104 MEQ/L  104 MEQ/L 


 


Carbon Dioxide Level 26.9 MEQ/L  29.4 MEQ/L 


 


Anion Gap 7 MEQ/L  6 MEQ/L 


 


Estimat Glomerular Filtration


Rate 37 ML/MIN 


  40 ML/MIN 


 








Physical Examination


HEENT: Pupils round and reactive to light; normocephalic; atraumatic; no 

jaundice.  Throat is clear.


NECK: Neck is supple, no JVD, no lymphadenopathy.


CHEST:  Chest is clear to auscultation and percussion.


CARDIAC:  Regular rate and rhythm with no murmur gallop or rubs.


ABDOMEN:  Somewhat tense, but not guarding.  Mild tenderness in LUQ.  Bowel 

sounds quiet.  No obstructive sounds.


EXTREMITIES: No clubbing, cyanosis, or edema.


SKIN:  Normal; no rash; no jaundice.


CNS:  pleasantly demented.





Assessment and Plan


Plan


Impression:  Fairly severe constipation, without obstipation


Abdominal hernia with colon in the left chest.  No obstruction.  Still, this 

may need to be repaired.





Plan:  Wash out colon with miralax 2 doses every hour until he is running 

watery.  


General surgery should be consulted regarding his hernia.











Enrique Jasso MD Sep 2, 2017 10:58

## 2017-09-03 NOTE — HHI.PR
Subjective


Remarks


Follow up for fecal impaction/severe constipation. The patient is not the best 

historian. He reports diffuse abdominal pain and "a little bit" of nausea, but 

no vomiting. He did have 1 large BM last night. He is tolerating oral intake. 

No fevers/chills. He complains of pain at the left wrist IV site. Denies any 

other medical complaints at this time.





Objective


Vitals





Vital Signs








  Date Time  Temp Pulse Resp B/P (MAP) Pulse Ox O2 Delivery O2 Flow Rate FiO2


 


9/3/17 08:26 96.9 90 20 116/74 (88) 95   


 


9/3/17 05:53 98.1 74 18 133/82 (99) 97   


 


9/3/17 00:29 98.1 74 18 105/69 (81) 97   


 


9/2/17 20:09 98.1 67 18 131/83 (99) 98   


 


9/2/17 17:12 97.9 84 20 132/97 (109) 96   


 


9/2/17 12:44 97.9 80 20 122/68 (86) 96   














I/O      


 


 9/2/17 9/2/17 9/2/17 9/3/17 9/3/17 9/3/17





 07:00 15:00 23:00 07:00 15:00 23:00


 


Intake Total 200 ml 200 ml 2200 ml   


 


Balance 200 ml 200 ml 2200 ml   


 


      


 


Intake Oral 200 ml 200 ml 2200 ml   


 


# Bowel Movements    1  








Result Diagram:  


9/2/17 0750                                                                    

            9/2/17 0750





Imaging





Last Impressions








Abdomen/Pelvis CT 9/1/17 2018 Signed





Impressions: 





 Service Date/Time:  Friday, September 1, 2017 21:57 - CONCLUSION:  1. Negative 





 for bowel obstruction. 2. Constipation, especially in the rectum which is 





 distended to 8.5 cm. 3. Previous distal esophagectomy and gastric pull-through 





 procedure with herniation of colon and to the lower left chest.     Marcial Wells MD 








Objective Remarks


GENERAL: Well-nourished, well-developed male patient in NAD.


SKIN: Warm and dry. No rash.


HEENT:  Normocephalic. Atraumatic. Pupils equal and round. Mucous membranes 

pink and moist.


CARDIOVASCULAR: Regular rate and rhythm.  S1, S2 noted. No murmur appreciated. 


RESPIRATORY: No accessory muscle use. Clear to auscultation. Breath sounds 

equal bilaterally.  


GASTROINTESTINAL: Abdomen soft, nondistended, mild left sided TTP. Normoactive 

bowel sounds x4.


MUSCULOSKELETAL: No obvious deformities. Extremities without clubbing, cyanosis

, or edema. 


NEUROLOGICAL: Awake and alert. No obvious cranial nerve deficits.  Motor 

grossly within normal limits.


PSYCHIATRIC: Pleasantly confused mood and affect; insight and judgment limited.


Medications and IVs





Current Medications








 Medications


  (Trade)  Dose


 Ordered  Sig/Mary


 Route  Start Time


 Stop Time Status Last Admin


 


 Sodium Chloride  1,000 ml @ 


 100 mls/hr  Q10H


 IV  9/1/17 23:43


    9/2/17 09:56


 


 


  (NS Flush)  2 ml  UNSCH  PRN


 IV FLUSH  9/1/17 23:45


     


 


 


  (NS Flush)  2 ml  BID


 IV FLUSH  9/2/17 09:00


    9/2/17 09:56


 


 


  (Zofran Inj)  4 mg  Q6H  PRN


 IVP  9/1/17 23:45


     


 


 


  (Tylenol)  650 mg  Q6H  PRN


 PO  9/1/17 23:45


     


 


 


  (Morphine Inj)  1 mg  Q3H  PRN


 IV  9/1/17 23:45


     


 


 


  (Morphine Inj)  2 mg  Q3H  PRN


 IV  9/1/17 23:45


     


 


 


  (Ilsa-Colace)  1 tab  BID


 PO  9/2/17 09:00


    9/2/17 22:05


 


 


  (Milk Of


 Magnesia Liq)  30 ml  Q12H  PRN


 PO  9/1/17 23:45


    9/2/17 01:52


 


 


  (Senokot)  17.2 mg  Q12H  PRN


 PO  9/1/17 23:45


     


 


 


  (Dulcolax Supp)  10 mg  DAILY  PRN


 RECTAL  9/1/17 23:45


     


 


 


  (Lactulose Liq)  30 ml  DAILY  PRN


 PO  9/1/17 23:45


    9/2/17 01:52


 


 


  (Abilify)  2 mg  DAILY@0600


 PO  9/3/17 06:00


    9/3/17 05:24


 


 


  (Wellbutrin Sr)  150 mg  BID@0900,1600


 PO  9/2/17 16:00


    9/2/17 15:58


 


 


  (Namenda)  10 mg  BID


 PO  9/2/17 21:00


    9/2/17 22:05


 


 


  (Desyrel)  50 mg  HS


 PO  9/2/17 21:00


    9/2/17 22:05


 


 


  (Effexor Xr)  150 mg  HS


 PO  9/2/17 21:00


    9/2/17 22:04


 


 


  (Miralax)  34 gm  Q1HR


 PO  9/3/17 09:00


 9/3/17 14:01   


 


 


  (Dulcolax Ec)  10 mg  Q1HR


 PO  9/3/17 09:00


 9/3/17 12:01   


 











A/P


Problem List:  


(1) Fecal impaction in rectum


ICD Code:  K56.41 - Fecal impaction


Status:  Acute


(2) Renal insufficiency


ICD Code:  N28.9 - Disorder of kidney and ureter, unspecified


Status:  Chronic


(3) Dementia


ICD Code:  F03.90 - Unspecified dementia without behavioral disturbance


Status:  Chronic


Assessment and Plan


66-year-old male with a PMH of Dementia, Anxiety, Depression and Esophageal/

Gastric CA s/p Gastrectomy who was sent from [facility] for eval 

of possible SBO





Fecal Impaction/Severe Constipation: Outpatient X-ray w/ evidence of Ileus, 

sent to ER from Rehab for concern for bowel obstruction.  CT Abd/Pelvis 

negative for bowel obstruction, +constipation w/ rectum distended to 8.5cm, 

images reviewed by me.  GI consulted. Patient started on Miralax and Dulcolax 

scheduled. Clear liquid diet. Give IVF, analgesics/antiemetics as needed. 





Abdominal Hernia: General surgery consulted, recommends medical management and 

close observation, no surgical intervention at this time, outpatient f/up. 





HARRY on CKD: Creatinine 1.84, previously 1.53 on 4/12/17.  Creatinine improving 

to 1.72 with IVF, continue.





Dementia:  Severe.  At baseline.  Resume home medications.





DVT Prophylaxis:  SCD/Teds.


Discharge Planning


0940hrs: Discharge pending further clinical improvement.











Yaa Yoo PA-C Sep 3, 2017 9:45 am

## 2017-09-03 NOTE — HHI.GIFU
Subjective


Remarks


Patient reports he had a bowel movement.  He has been very slow to drink his 

miralax.  He also has a large hernia in the left diaphragm with colon up in his 

chest.  His pain is on the left side.  He seems more comfortable today.  He is 

complaining of the IV site in his left wrist.





Objective


Vitals I&O





Vital Signs








  Date Time  Temp Pulse Resp B/P (MAP) Pulse Ox O2 Delivery O2 Flow Rate FiO2


 


9/3/17 08:26 96.9 90 20 116/74 (88) 95   


 


9/3/17 05:53 98.1 74 18 133/82 (99) 97   


 


9/3/17 00:29 98.1 74 18 105/69 (81) 97   


 


9/2/17 20:09 98.1 67 18 131/83 (99) 98   


 


9/2/17 17:12 97.9 84 20 132/97 (109) 96   


 


9/2/17 12:44 97.9 80 20 122/68 (86) 96   














I/O      


 


 9/2/17 9/2/17 9/2/17 9/3/17 9/3/17 9/3/17





 07:00 15:00 23:00 07:00 15:00 23:00


 


Intake Total 200 ml 200 ml 2200 ml   


 


Balance 200 ml 200 ml 2200 ml   


 


      


 


Intake Oral 200 ml 200 ml 2200 ml   


 


# Bowel Movements    1  








Physical Exam


HEENT: Pupils round and reactive to light; normocephalic; atraumatic; no 

jaundice.  Throat is clear.


NECK: Neck is supple, no JVD, no lymphadenopathy.


CHEST:  Chest is clear to auscultation and percussion.


CARDIAC:  Regular rate and rhythm with no murmur gallop or rubs.


ABDOMEN:  Soft, nondistended, nontender; no hepatosplenomegaly; bowel sounds 

are present in all four quadrants.


EXTREMITIES: No clubbing, cyanosis, or edema.


SKIN:  Normal; no rash; no jaundice.


CNS:  No focal deficits.  Not communicative.





Assessment and Plan


Plan


Impression:  Fairly severe constipation, without obstipation


Abdominal hernia with colon in the left chest.  No obstruction.  Still, this 

may need to be repaired.





Plan:  Continue with miralax and add dulcolax every hour for 4 double doses 

today.   


General surgery should be consulted regarding his hernia.


Remove the IV in his left wrist and replace somewhere else.











Enrique Jasso MD Sep 3, 2017 09:02

## 2017-09-04 NOTE — HHI.DCPOC
Discharge Care Plan


Diagnosis:  


(1) Fecal impaction in rectum


Goals to Promote Your Health


* To prevent worsening of your condition and complications


* To maintain your health at the optimal level


Directions to Meet Your Goals


*** Take your medications as prescribed


*** Follow your dietary instruction


*** Follow activity as directed








*** Keep your appointments as scheduled


*** Take your immunizations and boosters as scheduled


*** If your symptoms worsen call your PCP, if no PCP go to Urgent Care Center 

or Emergency Room***


*** Smoking is Dangerous to Your Health. Avoid second hand smoke***


***Call the 24-hour hour crisis hotline for domestic abuse at 1-298.928.9144***











Yaa Yoo PA-C Sep 4, 2017 5:17 pm

## 2017-09-04 NOTE — HHI.GIFU
Subjective


Remarks


Pt reports he feels better.  Nurse reports he had large soft BM yesterday.  

Uncertain what happened last night.  Abdomen is soft.  He cannot give good 

history.  We will give more miralax today.





Objective


Vitals I&O





Vital Signs








  Date Time  Temp Pulse Resp B/P (MAP) Pulse Ox O2 Delivery O2 Flow Rate FiO2


 


9/4/17 07:38 98.3 86 17 127/83 (98) 97   


 


9/4/17 03:10 98.0 86 18 136/87 (103) 97   


 


9/4/17 00:11 98.5 88 18 121/89 (100) 97   


 


9/3/17 20:53 98.2 86 18 139/86 (103) 97   


 


9/3/17 16:40 96.9 90 20 135/80 (98) 95   


 


9/3/17 12:29 97.5 90 18 118/70 (86) 98   














I/O      


 


 9/3/17 9/3/17 9/3/17 9/4/17 9/4/17 9/4/17





 06:59 14:59 22:59 06:59 14:59 22:59


 


Intake Total   200 ml   


 


Balance   200 ml   


 


      


 


Intake Oral   200 ml   


 


# Voids   1   


 


# Bowel Movements 1  1   








Physical Exam


HEENT: Pupils round and reactive to light; normocephalic; atraumatic; no 

jaundice.  Throat is clear.


NECK: Neck is supple, no JVD, no lymphadenopathy.


CHEST:  Chest is clear to auscultation and percussion.


CARDIAC:  Regular rate and rhythm with no murmur gallop or rubs.


ABDOMEN:  Soft, nondistended, nontender; no hepatosplenomegaly; bowel sounds 

are present in all four quadrants.


EXTREMITIES: No clubbing, cyanosis, or edema.


SKIN:  Normal; no rash; no jaundice.


CNS:  No focal deficits.  Not communicative.





Assessment and Plan


Plan


Impression:  Fairly severe constipation, without obstipation.  Some results 

with miralax and dulcolax but I expect more.


Abdominal hernia with colon in the left chest.  No obstruction.  Surgery has 

evaluated.  No surgical intervention planned.





Plan:  Repeat another six doses of miralax and four dulcolax pills today.   


Once he is passing watery stools we can stop the miralax.  He can be discharged 

then.











Enrique Jasso MD Sep 4, 2017 09:55

## 2017-09-05 NOTE — HHI.PR
Subjective


Remarks


Follow up for fecal impaction/severe constipation. The patient's discharge was 

held overnight due to lack of transportation back to his facility. He continues 

to reports 3 large BMs yesterday. Denies any abdominal pain, nausea/vomiting. He

's tolerating oral intake. He is stable to return to his SNF.





Objective


Vitals





Vital Signs








  Date Time  Temp Pulse Resp B/P (MAP) Pulse Ox O2 Delivery O2 Flow Rate FiO2


 


9/5/17 03:33 98.5 75 18 120/69 (86) 95   


 


9/5/17 01:11  104  103/66 (78)    


 


9/4/17 23:21 98.2 80 17 140/70 (93) 95   


 


9/4/17 19:18 98.1 85 17 147/69 (95) 95   


 


9/4/17 15:22 97.8 86 17 160/95 (116) 97   


 


9/4/17 11:26 98.0 85 18 131/90 (104) 98   














I/O      


 


 9/4/17 9/4/17 9/4/17 9/5/17 9/5/17 9/5/17





 07:00 15:00 23:00 07:00 15:00 23:00


 


Output Total    800 ml  


 


Balance    -800 ml  


 


      


 


Output Stool Total    800 ml  


 


# Voids  1 1   


 


# Bowel Movements  1 1   








Result Diagram:  


9/2/17 0750                                                                    

            9/2/17 0750





Imaging





Last Impressions








Abdomen/Pelvis CT 9/1/17 2018 Signed





Impressions: 





 Service Date/Time:  Friday, September 1, 2017 21:57 - CONCLUSION:  1. Negative 





 for bowel obstruction. 2. Constipation, especially in the rectum which is 





 distended to 8.5 cm. 3. Previous distal esophagectomy and gastric pull-through 





 procedure with herniation of colon and to the lower left chest.     Marcial Wells MD 








Objective Remarks


GENERAL: Well-nourished, well-developed male patient in NAD.


SKIN: Warm and dry. No rash.


HEENT:  Normocephalic. Atraumatic. Pupils equal and round. Mucous membranes 

pink and moist.


CARDIOVASCULAR: Regular rate and rhythm.  S1, S2 noted. No murmur appreciated. 


RESPIRATORY: No accessory muscle use. Clear to auscultation. Breath sounds 

equal bilaterally.  


GASTROINTESTINAL: Abdomen soft, nondistended, nontender. Normoactive bowel 

sounds x4.


MUSCULOSKELETAL: No obvious deformities. Extremities without clubbing, cyanosis

, or edema. 


NEUROLOGICAL: Awake and alert. No obvious cranial nerve deficits.  Motor 

grossly within normal limits.


PSYCHIATRIC: Pleasantly confused mood and affect; insight and judgment limited.


Procedures


None.





A/P


Problem List:  


(1) Fecal impaction in rectum


ICD Code:  K56.41 - Fecal impaction


Status:  Acute


(2) Renal insufficiency


ICD Code:  N28.9 - Disorder of kidney and ureter, unspecified


Status:  Chronic


(3) Dementia


ICD Code:  F03.90 - Unspecified dementia without behavioral disturbance


Status:  Chronic


Assessment and Plan


66-year-old male with a PMH of Dementia, Anxiety, Depression and Esophageal/

Gastric CA s/p Gastrectomy who was sent from [facility] for eval 

of possible SBO





Fecal Impaction/Severe Constipation: Outpatient X-ray w/ evidence of Ileus, 

sent to ER from Rehab for concern for bowel obstruction.  CT Abd/Pelvis 

negative for bowel obstruction, +constipation w/ rectum distended to 8.5cm, 

images reviewed by me.  GI consulted, started on Miralax and Dulcolax q1h 

[id number] each scheduled daily. Given clear liquid diet. Given IVF, analgesics/

antiemetics as needed. GI following, if patient starts passing watery stools, 

miralax can be stopped and he can be discharged. This afternoon patient had 3 

watery and very loose stools on the day of discharge. He has no abdominal 

complaints. Symptoms resolved. Will discharge back to SNF. 





Abdominal Hernia: GI requested General surgery evaluation for the hernia 

possibly contributing to above symptoms, general surgery recommended medical 

management and close observation, no surgical intervention at this time, 

outpatient f/up.


Discharge Planning


See discharge summary from yesterday. Patient discharged early this morning 9/5 

back to SNF.











Yaa Yoo PA-C Sep 5, 2017 10:14 am

## 2017-11-18 NOTE — RADRPT
EXAM DATE/TIME:  11/18/2017 09:18 

 

[facility] COMPARISON:     

CT ABDOMEN & PELVIS W/O CONTRAST, September 01, 2017, 21:57.  CHEST SINGLE AP, April 09, 2017, 2:31.

 

                     

INDICATIONS :     

Alteered mental status.

                     

 

MEDICAL HISTORY :            

Dementia. Carcinoma, esophageal.   

 

SURGICAL HISTORY :        

Partial gastrectomy

 

ENCOUNTER:     

Initial                                        

 

ACUITY:     

1 day      

 

PAIN SCORE:     

Non-responsive.

 

LOCATION:     

Bilateral chest 

 

FINDINGS:     

Single AP view of the chest. Configuration of the mediastinum is similar to prior study. Patient is r
eportedly status post esophagectomy with gastric pull-through. Postsurgical findings may account for 
the right perihilar density that is unchanged. Left-sided diaphragmatic hernia again seen. There is i
ncreased patchy opacity at the left lung base suggesting atelectasis versus consolidation. Mild blunt
ing of the left costophrenic sulcus suggest small pleural effusion.

 

CONCLUSION:     

1. Patchy left lower lobe consolidation versus atelectasis and small left pleural effusion.

2. Postsurgical mediastinal configuration unchanged. Left-sided diaphragmatic hernia again seen.

 

 

 

 Timbo George MD on November 18, 2017 at 9:30           

Board Certified Radiologist.

 This report was verified electronically.

## 2017-11-18 NOTE — RADRPT
EXAM DATE/TIME:  11/18/2017 09:11 

 

[facility] COMPARISON:     

CT BRAIN W/O CONTRAST, April 09, 2017, 2:56.

 

 

INDICATIONS :     

Altered mental status.

                      

 

RADIATION DOSE:     

37.65 CTDIvol (mGy) 

 

 

 

MEDICAL HISTORY :     

Dementia.  

 

SURGICAL HISTORY :      

None. 

 

ENCOUNTER:      

Initial

 

ACUITY:      

1 day

 

PAIN SCALE:      

Non-responsive

 

LOCATION:        

cranial 

 

TECHNIQUE:     

Multiple contiguous axial images were obtained of the head.  Using automated exposure control and adj
ustment of the mA and/or kV according to patient size, radiation dose was kept as low as reasonably a
chievable to obtain optimal diagnostic quality images.   DICOM format image data is available electro
nically for review and comparison.  

 

FINDINGS:     

 

CEREBRUM:     

The ventricles are normal for age.  No evidence of midline shift, mass lesion, hemorrhage or acute in
farction.  No extra-axial fluid collections are seen.

 

POSTERIOR FOSSA:     

The cerebellum and brainstem are intact.  The 4th ventricle is midline.  The cerebellopontine angle i
s unremarkable.

 

EXTRACRANIAL:     

Complete opacification and expansion of the right maxillary sinus is again seen and unchanged. High d
ensity material again seen in the right maxillary sinus.

 

SKULL:     

The calvaria is intact.  No evidence of skull fracture.

 

CONCLUSION:     

1. Severe chronic right maxillary sinusitis unchanged.

2. No acute intracranial findings.

 

 

 

 Timbo George MD on November 18, 2017 at 9:25           

Board Certified Radiologist.

 This report was verified electronically.

## 2017-11-18 NOTE — RADRPT
EXAM DATE/TIME:  11/18/2017 13:10 

 

[facility] COMPARISON:     

CT BRAIN W/O CONTRAST, November 18, 2017, 9:11.

       

 

 

INDICATIONS :     

Altered mental status.

                     

 

MEDICAL HISTORY :     

None.     

 

SURGICAL HISTORY :           

ORIF HIP

 

ENCOUNTER:     

Initial

 

ACUITY:     

1 day

 

PAIN SCORE:     

0/10

 

LOCATION:       

cranial 

 

TECHNIQUE:     

Multiplanar, multisequence MRI of the brain was performed without contrast.

 

FINDINGS:     

Diffusion weighted images demonstrate no evidence for acute infarction. Ventricles and cisterns are o
f normal size and configuration. There is circumferential mucosal thickening in the right maxillary s
inus with probable fungal colonization bulging medially into the nasal cavity best visualized on CT. 
No hemorrhage. No masses.

 

CONCLUSION:     

No evidence for acute infarction.

Right maxillary sinus disease. 

 

 

 Vinnie Bueno MD on November 18, 2017 at 13:42           

Board Certified Radiologist.

 This report was verified electronically.

## 2017-11-18 NOTE — HHI.HP
__________________________________________________





[facility]


Service


[facility]


Primary Care Physician


Clive Caldwell MD


Admission Diagnosis





altered mental status.  Chronic kidney disease.


Diagnoses:  


Chief Complaint:  


Change in mental status


Travel History


International Travel<30 Days:  No


Contact w/Intl Traveler <30 Da:  No


Traveled to Known Affected Are:  No


History of Present Illness


66-year-old male with a medical history significant for dementia who normally 

ambulates in a wheelchair at the skilled nursing facility.  The patient was 

found slumped over in his wheelchair with altered mental status.  He was sent 

to the hospital for altered mental status.  The patient has been at the skilled 

nursing facility since April of this year after a hip fracture.


On my evaluation, the patient is awake and can answer some questions 

appropriately.  However he still confused about his whereabouts.  He is not 

sure what happened or why he is here.  Initial head CT unremarkable.  His vital 

signs and labs are unremarkable.  His med list does include some sedating 

medications.





Review of Systems


ROS Limitations:  Clinical Condition


Neurologic:  DENIES: Headache, Localized weakness


Psychiatric:  COMPLAINS OF: Confusion


Unable to obtain accurate review of systems is to the patient's current 

condition.  Confused.  Dementia at baseline.





Past Family Social History


Past Medical History


Anxiety, depression, dementia, esophageal/gastric cancer status post resection.


Past Surgical History


Partial gastrectomy


Reported Medications





Reported Meds & Active Scripts


Active


Bisac-Evac Supp (Bisacodyl) 10 Mg Supp 10 Mg RECTAL DAILY PRN


Aspirin Low Dose (Aspirin) 81 Mg Chew 81 Mg CHEW DAILY


Norco (Hydrocodone-Acetaminophen) 7.5-325 mg Tab 1 Tab PO Q6H PRN


Abilify (Aripiprazole) 2 Mg Tab 2 Mg PO DAILY@0600


Trazodone (Trazodone HCl) 50 Mg Tab 50 Mg PO HS


Namenda (Memantine) 10 Mg Tab 10 Mg PO BID


Venlafaxine ER 24 HR (Venlafaxine HCl) 150 Mg Cap 150 Mg PO HS


Reported


Senna-Tabs (Sennosides) 8.6 Mg Tab 8.6 Mg PO BID


Bupropion HCl 100 Mg Tab 100 Mg PO BID


Venlafaxine ER 24 HR (Venlafaxine HCl) 150 Mg Cap 150 Mg PO HS


Vitamin D3 (Cholecalciferol) 1,000 Unit Cap 1,000 Units PO DAILY


Integra (Multi-Vit/Iron-B Comp-Vit C) Unknown Strength Cap Unknown Dose


Allergies:  


Coded Allergies:  


     Sulfa (Sulfonamide Antibiotics) (Unverified  Allergy, Unknown, 17)


     penicillin G (Unverified  Allergy, Unknown, 17)


     prochlorperazine (Unverified  Allergy, Unknown, 17)


Family History


Unable to obtain due to the patient's current condition.


Social History


No tobacco, alcohol use reported





Physical Exam


Vital Signs





Vital Signs








  Date Time  Temp Pulse Resp B/P (MAP) Pulse Ox O2 Delivery O2 Flow Rate FiO2


 


17 14:06 97.4 69 22 140/81 (100) 99   


 


17 11:57  69 14 144/88 (106) 98 Nasal Cannula 2.00 


 


17 10:10  62 14 128/88 (101) 100 Nasal Cannula 2.00 


 


17 08:54  78 13 142/78 (99) 96   








Physical Exam


GENERAL: Elderly male.  Very confused.


SKIN: No rashes, ecchymoses or lesions. Cool and dry.


HEAD: Atraumatic. Normocephalic. No temporal or scalp tenderness.


EYES: Pupils equal round and reactive. Extraocular motions intact. No scleral 

icterus. No injection or drainage. 


ENT: Nose without drainage. Airway patent.


NECK: Trachea midline. No JVD or lymphadenopathy. Supple, nontender, no 

meningeal signs.


CARDIOVASCULAR: Regular rate and rhythm without murmurs, gallops, or rubs. 


RESPIRATORY: Clear to auscultation. Breath sounds equal bilaterally. No wheezes

, rales, or rhonchi.  


GASTROINTESTINAL: Abdomen soft, non-tender, nondistended. No hepato-splenomegaly

, or palpable masses. No guarding.


MUSCULOSKELETAL: Extremities without clubbing, cyanosis, or edema. No joint 

tenderness, effusion, or edema noted. No calf tenderness. Negative Homans sign 

bilaterally.


NEUROLOGICAL: Awake and alert to self only.  Confused.  Soft speech.


Laboratory





Laboratory Tests








Test


  17


09:03 17


09:07 17


10:15 17


12:27


 


White Blood Count 5.5    


 


Red Blood Count 3.81    


 


Hemoglobin 11.5    


 


Hematocrit 34.8    


 


Mean Corpuscular Volume 91.3    


 


Mean Corpuscular Hemoglobin 30.1    


 


Mean Corpuscular Hemoglobin


Concent 32.9 


  


  


  


 


 


Red Cell Distribution Width 16.7    


 


Platelet Count 308    


 


Mean Platelet Volume 6.7    


 


Neutrophils (%) (Auto) 60.2    


 


Lymphocytes (%) (Auto) 23.2    


 


Monocytes (%) (Auto) 13.8    


 


Eosinophils (%) (Auto) 1.8    


 


Basophils (%) (Auto) 1.0    


 


Neutrophils # (Auto) 3.3    


 


Lymphocytes # (Auto) 1.3    


 


Monocytes # (Auto) 0.8    


 


Eosinophils # (Auto) 0.1    


 


Basophils # (Auto) 0.1    


 


CBC Comment DIFF FINAL    


 


Differential Comment     


 


Prothrombin Time 10.7    


 


Prothromb Time International


Ratio 1.0 


  


  


  


 


 


Activated Partial


Thromboplast Time 25.9 


  


  


  


 


 


D-Dimer Quantitative (PE/DVT) 0.57    


 


Blood Urea Nitrogen 25    


 


Creatinine 1.77    


 


Random Glucose 110    


 


Total Protein 7.3    


 


Albumin 3.1    


 


Calcium Level 8.8    


 


Alkaline Phosphatase 142    


 


Aspartate Amino Transf


(AST/SGOT) 19 


  


  


  


 


 


Alanine Aminotransferase


(ALT/SGPT) 19 


  


  


  


 


 


Total Bilirubin 0.2    


 


Sodium Level 137    


 


Potassium Level 4.5    


 


Chloride Level 106    


 


Carbon Dioxide Level 27.1    


 


Anion Gap 4    


 


Estimat Glomerular Filtration


Rate 39 


  


  


  


 


 


Total Creatine Kinase 63    


 


Troponin I LESS THAN 0.02    


 


B-Type Natriuretic Peptide 46    


 


Thyroid Stimulating Hormone


3rd Gen 3.060 


  


  


  


 


 


Lactic Acid Level  1.4   


 


Urine Color   LIGHT-YELLOW  


 


Urine Turbidity   CLEAR  


 


Urine pH   5.5  


 


Urine Specific Gravity   1.012  


 


Urine Protein   TRACE  


 


Urine Glucose (UA)   NEG  


 


Urine Ketones   NEG  


 


Urine Occult Blood   NEG  


 


Urine Nitrite   NEG  


 


Urine Bilirubin   NEG  


 


Urine Urobilinogen   LESS THAN 2.0  


 


Urine Leukocyte Esterase   NEG  


 


Urine Transitional Epithelial


Cells 


  


  0-5 


  


 


 


Microscopic Urinalysis Comment


  


  


  CATH-CULT NOT


IND 


 














 Date/Time


Source Procedure


Growth Status


 


 


 17 09:03


Blood Peripheral Aerobic Blood Culture


Pending Received


 


 17 09:03


Blood Peripheral Anaerobic Blood Culture


Pending Received








Result Diagram:  


17








Caprini VTE Risk Assessment


Caprini VTE Risk Assessment:  Mod/High Risk (score >= 2)


Caprini Risk Assessment Model











 Point Value = 1          Point Value = 2  Point Value = 3  Point Value = 5


 


Age 41-60


Minor surgery


BMI > 25 kg/m2


Swollen legs


Varicose veins


Pregnancy or postpartum


History of unexplained or recurrent


   spontaneous 


Oral contraceptives or hormone


   replacement


Sepsis (< 1 month)


Serious lung disease, including


   pneumonia (< 1 month)


Abnormal pulmonary function


Acute myocardial infarction


Congestive heart failure (< 1 month)


History of inflammatory bowel disease


Medical patient at bed rest Age 61-74


Arthroscopic surgery


Major open surgery (> 45 min)


Laparoscopic surgery (> 45 min)


Malignancy


Confined to bed (> 72 hours)


Immobilizing plaster cast


Central venous access Age >= 75


History of VTE


Family history of VTE


Factor V Leiden


Prothrombin 83357V


Lupus anticoagulant


Anticardiolipin antibodies


Elevated serum homocysteine


Heparin-induced thrombocytopenia


Other congenital or acquired


   thrombophilia Stroke (< 1 month)


Elective arthroplasty


Hip, pelvis, or leg fracture


Acute spinal cord injury (< 1 month)








Prophylaxis Regimen











   Total Risk


Factor Score Risk Level Prophylaxis Regimen


 


0-1      Low Early ambulation


 


2 Moderate Order ONE of the following:


*Sequential Compression Device (SCD)


*Heparin 5000 units SQ BID


 


3-4 Higher Order ONE of the following medications:


*Heparin 5000 units SQ TID


*Enoxaparin/Lovenox 40 mg SQ daily (WT < 150 kg, CrCl > 30 mL/min)


*Enoxaparin/Lovenox 30 mg SQ daily (WT < 150 kg, CrCl > 10-29 mL/min)


*Enoxaparin/Lovenox 30 mg SQ BID (WT < 150 kg, CrCl > 30 mL/min)


AND/OR


*Sequential Compression Device (SCD)


 


5 or more Highest Order ONE of the following medications:


*Heparin 5000 units SQ TID (Preferred with Epidurals)


*Enoxaparin/Lovenox 40 mg SQ daily (WT < 150 kg, CrCl > 30 mL/min)


*Enoxaparin/Lovenox 30 mg SQ daily (WT < 150 kg, CrCl > 10-29 mL/min)


*Enoxaparin/Lovenox 30 mg SQ BID (WT < 150 kg, CrCl > 30 mL/min)


AND


*Sequential Compression Device (SCD)











Assessment and Plan


Problem List:  


(1) Acute encephalopathy


ICD Code:  G93.40 - Encephalopathy, unspecified


(2) Dementia


ICD Code:  F03.90 - Unspecified dementia without behavioral disturbance


Status:  Chronic


(3) Renal insufficiency


ICD Code:  N28.9 - Disorder of kidney and ureter, unspecified


Status:  Chronic


(4) Anxiety and depression


ICD Code:  F41.9 - Anxiety disorder, unspecified; F32.9 - Major depressive 

disorder, single episode, unspecified


Status:  Acute


Assessment and Plan


66-year-old male with history of dementia presents with altered mental status.  

Apparently the patient normally uses a wheelchair because was found slumped 

over in the chair with altered mental status.  He is more awake since arriving 

to the hospital.  Per review of his med list, he is on no diffuse sedating 

medications.





Acute encephalopathy: Could be medication related.


To rule out stroke.  Head CT unremarkable


- MRI ordered


- Continue serial neuro checks. Hold Trazodone, Norco, and Bupropion. Monitor 

response. 


- History of dementia at baseline. Continue Namenda


- Continue daily Aspirin





Chronic kidney disease:


- At baseline. Continue to monitor


- Gentle IVF


Discussed Condition With


ED physician Anna Cosby MD 2017 14:28

## 2017-11-18 NOTE — RADRPT
EXAM DATE/TIME:  11/18/2017 09:42 

 

[facility] COMPARISON:     

CT ABDOMEN & PELVIS W/O CONTRAST, September 01, 2017, 21:57.

 

 

INDICATIONS :     

Shortness of breath post operative hip surgery.

                      

 

IV CONTRAST:     

50 cc Visipaque (iodixanol) IV 

 

 

RADIATION DOSE:     

9.25 CTDIvol (mGy) 

 

 

MEDICAL HISTORY :     

Dementia.  

 

SURGICAL HISTORY :       

Hip, gastric.

 

ENCOUNTER:      

Initial

 

ACUITY:      

1 day

 

PAIN SCALE:      

4/10

 

LOCATION:       

Bilateral cranial 

 

TECHNIQUE:     

Volumetric scanning of the chest was performed using a pulmonary embolism protocol MIP images were re
constructed.  Using automated exposure control and adjustment of the mA and/or kV according to patien
t size, radiation dose was kept as low as reasonably achievable to obtain optimal diagnostic quality 
images.   DICOM format image data is available electronically for review and comparison.  

 

Follow-up recommendations for detected pulmonary nodules are based at a minimum on nodule size and pa
tient risk factors according to Fleischner Society Guidelines.

 

FINDINGS:     

 

PULMONARY ARTERIES:

No filling defects are seen in the pulmonary arteries through the segmental level.

 

LUNGS:     

Bilateral lower lobe atelectasis is very similar to the prior CT abdomen study of 9/1/2017. The lungs
 are otherwise clear.

 

PLEURAE:     

There is no pleural thickening or pleural effusion.

 

MEDIASTINUM:     

Postsurgical findings consistent with the patient's history of esophagectomy and gastric pull-through
. Left-sided diaphragmatic hernia with extension of colon into the chest again seen.

 

MUSCULOSKELETAL:     

Scoliosis and degenerative findings of the thoracic spine again seen.

 

MISCELLANEOUS:     

Upper abdomen unremarkable.

 

CONCLUSION:     

No evidence of pulmonary embolus. Bilateral lower lobe atelectasis unchanged. Post surgical findings 
the mediastinum and diaphragmatic hernia also unchanged.

 

 

 

 

 Timbo George MD on November 18, 2017 at 10:06           

Board Certified Radiologist.

 This report was verified electronically.

## 2017-11-18 NOTE — EKG
Date Performed: 11/18/2017       Time Performed: 08:55:14

 

PTAGE:      66 years

 

EKG:      Sinus rhythm 

 

 Nonspecific inferior T wave changes Compared to previous tracing, inferior T wave changes are new NO
RMAL ECG

 

PREVIOUS TRACING       : 04/09/2017 02.00

 

DOCTOR:   Clive Prasad  Interpretating Date/Time  11/18/2017 16:06:42

## 2017-11-18 NOTE — PD
HPI


Chief Complaint:  Altered Mental Status


Time Seen by Provider:  08:57


Travel History


International Travel<30 days:  No


Contact w/Intl Traveler<30days:  No


Traveled to known affect area:  No





History of Present Illness


HPI


66 years old male was brought in from local nursing home [facility] 

mental status.  Patient was last seen normal at 8:00 this morning.  Patient was 

a given morning medication.  Patient was on oxycodone for pain however has not 

taken it since 8 days ago.  Patient was checked later and was found slumping 

over his wheelchair this morning.  EMS was called.  GCS at the scene was 8.  

GCS Normally was 15.  Patient was transferred to ED for evaluation.  Patient is 

in rehabilitation facility for fractured hip.  Fracture hip was in April of 

this year.  Medical records from nursing home in rehabilitation shows history 

of dementia.





PFSH


Past Medical History


Asthma:  No


Blood Disorders:  No


Anxiety:  No


Depression:  No


Heart Rhythm Problems:  No


Cancer:  No


Cardiovascular Problems:  No


High Cholesterol:  No


Chemotherapy:  No


Chest Pain:  No


Congestive Heart Failure:  No


COPD:  No


Dementia:  Yes


Diabetes:  No


Endocrine:  No


Genitourinary:  No


Immune Disorder:  No


Musculoskeletal:  Yes (weak in legs)


Neurologic:  Yes (weak in legs)


Psychiatric:  Yes (dementia)


Reproductive:  No


Respiratory:  No


Radiation Therapy:  No


Sleep Apnea:  No


Thyroid Disease:  No





Past Surgical History


Abdominal Surgery:  Yes


Pacemaker:  No


Other Surgery:  Yes (partial gastrectomy)





Social History


Alcohol Use:  No


Tobacco Use:  No


Substance Use:  No





Allergies-Medications


(Allergen,Severity, Reaction):  


Coded Allergies:  


     Sulfa (Sulfonamide Antibiotics) (Unverified  Allergy, Unknown, 11/18/17)


     penicillin G (Unverified  Allergy, Unknown, 11/18/17)


     prochlorperazine (Unverified  Allergy, Unknown, 11/18/17)


Reported Meds & Prescriptions





Reported Meds & Active Scripts


Active


Bisac-Evac Supp (Bisacodyl) 10 Mg Supp 10 Mg RECTAL DAILY PRN


Aspirin Low Dose (Aspirin) 81 Mg Chew 81 Mg CHEW DAILY


Norco (Hydrocodone-Acetaminophen) 7.5-325 mg Tab 1 Tab PO Q6H PRN


Abilify (Aripiprazole) 2 Mg Tab 2 Mg PO DAILY@0600


Trazodone (Trazodone HCl) 50 Mg Tab 50 Mg PO HS


Namenda (Memantine) 10 Mg Tab 10 Mg PO BID


Venlafaxine ER 24 HR (Venlafaxine HCl) 150 Mg Cap 150 Mg PO HS


Reported


Senna-Tabs (Sennosides) 8.6 Mg Tab 8.6 Mg PO BID


Bupropion HCl 100 Mg Tab 100 Mg PO BID


Venlafaxine ER 24 HR (Venlafaxine HCl) 150 Mg Cap 150 Mg PO HS


Vitamin D3 (Cholecalciferol) 1,000 Unit Cap 1,000 Units PO DAILY


Integra (Multi-Vit/Iron-B Comp-Vit C) Unknown Strength Cap Unknown Dose  








Review of Systems


ROS Limitations:  Altered Mental Status





Physical Exam


Narrative


GENERAL: Well-nourished, well-developed patient.


SKIN: Focused skin assessment warm/dry.


HEAD: Normocephalic.


EYES: No scleral icterus. No injection or drainage.  Pupils 2 mm equal reactive.


NECK: Supple, trachea midline. No JVD or lymphadenopathy.


CARDIOVASCULAR: Regular rate and rhythm without murmurs, gallops, or rubs. 


RESPIRATORY: Breath sounds equal bilaterally. No accessory muscle use.


GASTROINTESTINAL: Abdomen soft, non-tender, nondistended. 


MUSCULOSKELETAL: No cyanosis, or edema. 


BACK: Nontender without obvious deformity. No CVA tenderness. 


Neurologic exam: Patient's lethargic and not vocalizing.  Patient moves 

extremity with pain stimuli.  Patient does not follow command.  Deep tendon 

reflexes +1 and equal.  Negative Babinski.





Data


Data


Last Documented VS





Vital Signs








  Date Time  Temp Pulse Resp B/P (MAP) Pulse Ox O2 Delivery O2 Flow Rate FiO2


 


11/18/17 10:10  62 14 128/88 (101) 100 Nasal Cannula 2.00 








Orders





 Orders


Electrocardiogram (11/18/17 08:57)


Complete Blood Count With Diff (11/18/17 08:57)


Comprehensive Metabolic Panel (11/18/17 08:57)


Creatine Kinase (Cpk) (11/18/17 08:57)


Troponin I (11/18/17 08:57)


B-Type Natriuretic Peptide (11/18/17 08:57)


Prothrombin Time / Inr (Pt) (11/18/17 08:57)


Act Partial Throm Time (Ptt) (11/18/17 08:57)


Blood Culture (11/18/17 08:57)


Urinalysis - C+S If Indicated (11/18/17 08:57)


D-Dimer (11/18/17 08:57)


Thyroid Stimulating Hormone (11/18/17 08:57)


Chest, Single Ap (11/18/17 08:57)


Ct Brain W/O Iv Contrast(Rout) (11/18/17 08:57)


Iv Access Insert/Monitor (11/18/17 08:57)


Ecg Monitoring (11/18/17 08:57)


Oximetry (11/18/17 08:57)


Sodium Chlor 0.9% 1000 Ml Inj (Ns 1000 M (11/18/17 09:00)


Lactic Acid (11/18/17 09:02)


Ct Pulmonary Angiogram (11/18/17 09:05)


Iohexol 350 Inj (Omnipaque 350 Inj) (11/18/17 09:50)


Urinary Catheter Insert/Apply (11/18/17 09:51)





Labs





Laboratory Tests








Test


  11/18/17


09:03 11/18/17


09:07 11/18/17


10:15


 


White Blood Count 5.5 TH/MM3   


 


Red Blood Count 3.81 MIL/MM3   


 


Hemoglobin 11.5 GM/DL   


 


Hematocrit 34.8 %   


 


Mean Corpuscular Volume 91.3 FL   


 


Mean Corpuscular Hemoglobin 30.1 PG   


 


Mean Corpuscular Hemoglobin


Concent 32.9 % 


  


  


 


 


Red Cell Distribution Width 16.7 %   


 


Platelet Count 308 TH/MM3   


 


Mean Platelet Volume 6.7 FL   


 


Neutrophils (%) (Auto) 60.2 %   


 


Lymphocytes (%) (Auto) 23.2 %   


 


Monocytes (%) (Auto) 13.8 %   


 


Eosinophils (%) (Auto) 1.8 %   


 


Basophils (%) (Auto) 1.0 %   


 


Neutrophils # (Auto) 3.3 TH/MM3   


 


Lymphocytes # (Auto) 1.3 TH/MM3   


 


Monocytes # (Auto) 0.8 TH/MM3   


 


Eosinophils # (Auto) 0.1 TH/MM3   


 


Basophils # (Auto) 0.1 TH/MM3   


 


CBC Comment DIFF FINAL   


 


Differential Comment    


 


Prothrombin Time 10.7 SEC   


 


Prothromb Time International


Ratio 1.0 RATIO 


  


  


 


 


Activated Partial


Thromboplast Time 25.9 SEC 


  


  


 


 


D-Dimer Quantitative (PE/DVT) 0.57 MG/L FEU   


 


Blood Urea Nitrogen 25 MG/DL   


 


Creatinine 1.77 MG/DL   


 


Random Glucose 110 MG/DL   


 


Total Protein 7.3 GM/DL   


 


Albumin 3.1 GM/DL   


 


Calcium Level 8.8 MG/DL   


 


Alkaline Phosphatase 142 U/L   


 


Aspartate Amino Transf


(AST/SGOT) 19 U/L 


  


  


 


 


Alanine Aminotransferase


(ALT/SGPT) 19 U/L 


  


  


 


 


Total Bilirubin 0.2 MG/DL   


 


Sodium Level 137 MEQ/L   


 


Potassium Level 4.5 MEQ/L   


 


Chloride Level 106 MEQ/L   


 


Carbon Dioxide Level 27.1 MEQ/L   


 


Anion Gap 4 MEQ/L   


 


Estimat Glomerular Filtration


Rate 39 ML/MIN 


  


  


 


 


Total Creatine Kinase 63 U/L   


 


Troponin I


  LESS THAN 0.02


NG/ML 


  


 


 


B-Type Natriuretic Peptide 46 PG/ML   


 


Thyroid Stimulating Hormone


3rd Gen 3.060 uIU/ML 


  


  


 


 


Lactic Acid Level  1.4 mmol/L  


 


Urine Color   LIGHT-YELLOW 


 


Urine Turbidity   CLEAR 


 


Urine pH   5.5 


 


Urine Specific Gravity   1.012 


 


Urine Protein   TRACE mg/dL 


 


Urine Glucose (UA)   NEG mg/dL 


 


Urine Ketones   NEG mg/dL 


 


Urine Occult Blood   NEG 


 


Urine Nitrite   NEG 


 


Urine Bilirubin   NEG 


 


Urine Urobilinogen


  


  


  LESS THAN 2.0


MG/DL


 


Urine Leukocyte Esterase   NEG 


 


Urine Transitional Epithelial


Cells 


  


  0-5 /hpf 


 


 


Microscopic Urinalysis Comment


  


  


  CATH-CULT NOT


IND











MDM


Medical Decision Making


Medical Screen Exam Complete:  Yes


Emergency Medical Condition:  Yes


Interpretation(s)





Last Impressions








CT Angiography 11/18/17 0905 Signed





Impressions: 





 Service Date/Time:  Saturday, November 18, 2017 09:42 - CONCLUSION:  No 

evidence 





 of pulmonary embolus. Bilateral lower lobe atelectasis unchanged. Post 

surgical 





 findings the mediastinum and diaphragmatic hernia also unchanged.      Timbo George MD 


 


Head CT 11/18/17 0857 Signed





Impressions: 





 Service Date/Time:  Saturday, November 18, 2017 09:11 - CONCLUSION:  1. Severe 





 chronic right maxillary sinusitis unchanged. 2. No acute intracranial 

findings.  





    Timbo George MD 


 


Chest X-Ray 11/18/17 0857 Signed





Impressions: 





 Service Date/Time:  Saturday, November 18, 2017 09:18 - CONCLUSION:  1. Patchy 





 left lower lobe consolidation versus atelectasis and small left pleural 





 effusion. 2. Postsurgical mediastinal configuration unchanged. Left-sided 





 diaphragmatic hernia again seen.     Timbo George MD 





10:35 AM.  CBC within normal limit.  BUN 25.  Creatinine 1.77.  GFR 39.  Lactic 

acid 1.4.  Cardiac enzymes are normal.


Differential Diagnosis


Differential diagnosis including side effect to medications, TIA, CVA, MI, PE, 

electrolyte imbalance, dehydration.


Narrative Course


66 years old male with altered mental status.  Normal saline solution 100 cc an 

hour.  O2 2 L nasal cannula.





Diagnosis





 Primary Impression:  


 Altered mental status


 Qualified Codes:  R41.82 - Altered mental status, unspecified


 Additional Impression:  


 CKD (chronic kidney disease) stage 4, GFR 15-29 ml/min





Admitting Information


Admitting Physician Requests:  Admit











Jason Araiza MD Nov 18, 2017 09:05

## 2017-11-19 NOTE — HHI.PR
Subjective


Remarks


Follow up on patient with AMS, dementia.  Patient seen and examined.  Patient 

is confused, oriented to self only.  He states repetitively "I blacked out".  

He is a very poor historian.  He is unable to provide me with any meaningful 

past medical history.   He complains of frontal headache, slurred speech (

although he is speaking without any appreciable slurring at this time), vision 

changes that he describes as "moving in and out", fever, chills and cough.  He 

also endorses right lower extremity weakness which he states is new.  He denies 

any chest pain or shortness of breath.  He denies any chest pain or dyspnea.  

He denies any nausea, vomiting or abdominal pain.  Discussed with Ignacio PAREDES, 

patient appears more responsive to her today.  Eating well.  Ambulating in the 

[facility]aylor with PT.





Objective


Vitals





Vital Signs








  Date Time  Temp Pulse Resp B/P (MAP) Pulse Ox O2 Delivery O2 Flow Rate FiO2


 


11/19/17 09:11  104  117/64 (81)    





    113/63 (80)    


 


11/19/17 08:00  87      


 


11/19/17 07:25 97.6 85 22 127/77 (94) 96   


 


11/19/17 04:08  88      


 


11/19/17 03:13 98.3 89 16 111/68 (82) 94   


 


11/19/17 00:02  89      


 


11/18/17 23:12 98.6 83 17 103/72 (82) 97   


 


11/18/17 20:02  76      


 


11/18/17 19:47 98.4 77 17 125/68 (87) 98   


 


11/18/17 15:57 96.7 64 24 136/83 (100) 96   


 


11/18/17 14:06 97.4 69 22 140/81 (100) 99   


 


11/18/17 13:45  63      


 


11/18/17 11:57  69 14 144/88 (106) 98 Nasal Cannula 2.00 














I/O      


 


 11/18/17 11/18/17 11/18/17 11/19/17 11/19/17 11/19/17





 07:00 15:00 23:00 07:00 15:00 23:00


 


Intake Total    600 ml  


 


Output Total    500 ml  


 


Balance    100 ml  


 


      


 


Intake Oral    100 ml  


 


IV Total    500 ml  


 


Output Urine Total    500 ml  


 


# Bowel Movements    2  








Result Diagram:  


11/19/17 0600                                                                  

              11/19/17 0600





Imaging





Last Impressions








Brain MRI 11/18/17 1043 Signed





Impressions: 





 Service Date/Time:  Saturday, November 18, 2017 13:10 - CONCLUSION:  No 

evidence 





 for acute infarction. Right maxillary sinus disease.     Vinnie Bueno MD 


 


CT Angiography 11/18/17 0905 Signed





Impressions: 





 Service Date/Time:  Saturday, November 18, 2017 09:42 - CONCLUSION:  No 

evidence 





 of pulmonary embolus. Bilateral lower lobe atelectasis unchanged. Post 

surgical 





 findings the mediastinum and diaphragmatic hernia also unchanged.      Timbo George MD 


 


Head CT 11/18/17 0857 Signed





Impressions: 





 Service Date/Time:  Saturday, November 18, 2017 09:11 - CONCLUSION:  1. Severe 





 chronic right maxillary sinusitis unchanged. 2. No acute intracranial 

findings.  





    Timbo George MD 


 


Chest X-Ray 11/18/17 0857 Signed





Impressions: 





 Service Date/Time:  Saturday, November 18, 2017 09:18 - CONCLUSION:  1. Patchy 





 left lower lobe consolidation versus atelectasis and small left pleural 





 effusion. 2. Postsurgical mediastinal configuration unchanged. Left-sided 





 diaphragmatic hernia again seen.     Timbo George MD 








Medications and IVs





Current Medications








 Medications


  (Trade)  Dose


 Ordered  Sig/Mary


 Route  Start Time


 Stop Time Status Last Admin


 


  (Abilify)  2 mg  DAILY@0600


 PO  11/19/17 06:00


    11/19/17 06:21


 


 


  (Aspirin Chew)  81 mg  DAILY


 CHEW  11/19/17 09:00


    11/19/17 09:05


 


 


  (Vitamin D3)  1,000 units  DAILY


 PO  11/19/17 09:00


    11/19/17 09:05


 


 


  (Namenda)  10 mg  BID


 PO  11/18/17 21:00


    11/19/17 09:04


 


 


  (Senokot)  8.6 mg  BID


 PO  11/18/17 21:00


    11/19/17 09:05


 


 


  (NS Flush)  2 ml  UNSCH  PRN


 IV FLUSH  11/18/17 11:15


     


 


 


  (NS Flush)  2 ml  BID


 IV FLUSH  11/18/17 21:00


    11/18/17 23:52


 


 


  (Tylenol)  650 mg  Q4H  PRN


 PO  11/18/17 11:15


     


 


 


  (Zofran Inj)  4 mg  Q6H  PRN


 IVP  11/18/17 11:15


     


 


 


  (Heparin Inj)  5,000 units  Q12H


 SQ  11/18/17 12:00


    11/18/17 23:52


 


 


  (Narcan Inj)  0.4 mg  UNSCH  PRN


 IV PUSH  11/18/17 11:15


     


 


 


  (Milk Of


 Magnesia Liq)  30 ml  Q12H  PRN


 PO  11/18/17 11:15


     


 


 


  (Senokot)  17.2 mg  Q12H  PRN


 PO  11/18/17 11:15


     


 


 


  (Dulcolax Supp)  10 mg  DAILY  PRN


 RECTAL  11/18/17 11:15


     


 


 


  (Lactulose Liq)  30 ml  DAILY  PRN


 PO  11/18/17 11:15


     


 


 


 Potassium


 Chloride/Dextrose/


 Sod Cl  1,000 ml @ 


 100 mls/hr  Q10H


 IV  11/18/17 12:00


    11/19/17 00:07


 


 


  (Vibramycin)  100 mg  BID


 PO  11/19/17 13:00


 11/26/17 12:59   


 











A/P


Assessment and Plan


65yo male with PMHX of dementia, CKD stage IV, recent ORIF left hip s/p fall 9/ 17 admitted with AMS.





AMS


Syncope/near syncopal episode


   - uncertain etiology, ?dehydration, medication side effect, TIA/CVA, seizure 

activity, worsening dementia


   - appears patient has returned to baseline


   - reviewed previous medical record, per ED note in April patient with 

multiple witnessed syncopal episodes and bowel incontinence


   - CT head shows severe chronic right maxillary sinusitis


   - MRI brain shows right maxillary sinus disease, no e/o acute infarct.  

Begin Doxycycline 100mg BID.


   - CTA negative for PE


   - CXR personally reviewed showing patchy left lower lobe consolidation vs 

atelectasis and small left pleural effusion.  Patient has no symptoms of 

pneumonia.   IS ordered, encourage hourly use.


   - UA negative for infection.  D/C lewis catheter.


   - cardiac enzymes neg x 3


   - Continue to hold sedating medications


   - obtain orthostatic BP measurements 


   - obtain echocardiogram, EEG and carotid dopplers


   - obtain RPR and B12, B1 level


   - Consult Neurology, appreciate recommendations


   - continuous cardiac monitoring





Dementia


   - Continue on home dose of Namenda





Recent left hip fracture s/p ORIF 4/9/17


   - continue participation with PT





CKD stage IV


   - creatinine 1.45, GFR 49.  Appears to be at baseline.


   - Avoid nephrotoxic agents


   - monitor as indicated





DVT prophylaxis


   - Heparin sq





Discussed with nursing staff, patient and Dr. Bryson





Attending Statement


The exam, history, and the medical decision-making described in the above note 

were completed with the assistance of the mid-level provider. I reviewed and 

agree with the findings presented.  I attest that I had a face-to-face 

encounter with the patient on the same day, and personally performed and 

documented my assessment and findings in the medical record.





Appreciate seen and examined he is not oriented to anything except his name


He looks comfortable in no acute distress denied pain, he was unable to know 

the month or year the president or the place


Is about to get 2-D echo the heart


I personally reviewed MRI report no acute ischemia or other intracranial 

pathology


Possible progressive dementia, I will consult neurology for dementia workup





GENERAL: This is a frail elderly patient in no acute distress


SKIN: No rashes, warm and dry 


HEAD: Atraumatic. Normocephalic. 


EYES: Pupils equal round and reactive. Extraocular motions intact. No scleral 

icterus. 


ENT: Nose without bleeding, or drainage, Airway patent.


NECK: Trachea midline.  Supple


CARDIOVASCULAR: Regular rate and rhythm without murmurs, gallops, or rubs. 


RESPIRATORY: Fair air entry bilaterally. No wheezes, rales, or rhonchi.  


GASTROINTESTINAL: Abdomen soft, non-tender, nondistended.  Positive bowel sounds


MUSCULOSKELETAL: Extremities without clubbing, cyanosis, or edema.  Pedal 

pulses appreciated


NEUROLOGICAL: Awake and alert confused, moves all extremity.  Normal speech.no 

focal neurological deficit











Maira Garcia Nov 19, 2017 12:18


Alok Bryson MD Nov 19, 2017 13:55

## 2017-11-19 NOTE — ECHRPT
Indication:   CVA/TIA

 

 CONCLUSIONS

 Normal left ventricular size. 

 Wall thickness is measured at the upper limits of normal. 

 The left ventricular systolic function is low normal with an estimated ejection fraction in the rang
e of 50-

 55%. 

 Moderate mitral valve regurgitation. 

 Aortic valve sclerosis is present. 

 Moderate aortic valve regurgitation. 

 A small pleural effusion is noted. 

 

 BP:        /         HR:                          Rhythm:

 

 MEASUREMENTS  (Male / Female) Normal Values       Technical Quality:Good

 2D ECHO

 LV Diastolic Diameter PLAX        4.6 cm                4.2 - 5.9 / 3.9 - 5.3 cm

 LV Systolic Diameter PLAX         3.5 cm                

 IVS Diastolic Thickness           0.8 cm                0.6 - 1.0 / 0.6 - 0.9 cm

 LVPW Diastolic Thickness          0.7 cm                0.6 - 1.0 / 0.6 - 0.9 cm

 LV Relative Wall Thickness        0.3                   

 RV Internal Dim ED PLAX           2.0 cm                

 LA Systolic Diameter LX           3.7 cm                3.0 - 4.0 / 2.7 - 3.8 cm

 

 DOPPLER

 AV Peak Velocity                  193.0 cm/s            

 AV Peak Gradient                  14.9 mmHg             

 LVOT Peak Velocity                132.0 cm/s            

 LVOT Peak Gradient                7.0 mmHg              

 MR Peak Velocity                  516.0 cm/s            

 MR Peak Gradient                  106.5 mmHg            

 Mitral E Point Velocity           76.0 cm/s             

 Mitral A Point Velocity           104.0 cm/s            

 Mitral E to A Ratio               0.7                   

 TR Peak Velocity                  208.0 cm/s            

 TR Peak Gradient                  17.3 mmHg             

 Right Atrial Pressure             10.0 mmHg             

 Pulmonary Artery Systolic Pressu  27.3 mmHg             

 Right Ventricular Systolic Press  27.3 mmHg             

 

 

 FINDINGS

 

 LEFT VENTRICLE

 Doppler parameters are consistent with impaired left ventricular relaxtion (grade 1 diastolic dysfun
ction). 

 Normal left ventricular size. 

 Wall thickness is measured at the upper limits of normal. 

 The left ventricular systolic function is low normal with an estimated ejection fraction in the rang
e of 50-

 55%. 

 

 

 

 RIGHT VENTRICLE

 Normal right ventricular size and systolic function.  

 

 LEFT ATRIUM

 The left atrial size is normal.  

 

 RIGHT ATRIUM

 The right atrial size is normal.  

 

 ATRIAL SEPTUM

 Normal atrial septal thickness without atrial level shunting by limited color doppler interrogation.
  

 

 AORTA

 The aortic root and proximal ascending aorta are normal in size on limited imaging.  

 

 MITRAL VALVE

 Moderate mitral valve regurgitation. 

 

 AORTIC VALVE

 Aortic valve sclerosis is present. 

 Moderate aortic valve regurgitation. 

 

 TRICUSPID VALVE

 Structurally normal tricuspid valve. No tricuspid valve stenosis or regurgitation.  

 

 PULMONARY VALVE

 No pulmonary valve regurgitation or stenosis.  

 

 VESSELS

 The inferior vena cava is normal in size.  

 

 PERICARDIUM

 A small pleural effusion is noted. 

 

 

 

 

  Lamonte Motley MD

  (Electronically Signed)

  Final Date:19 November 2017 15:46

## 2017-11-19 NOTE — MB
cc:

GERARDO MARIN M.D.

****

 

 

DATE OF CONSULTATION:  11/19/2017.

 

HISTORY OF PRESENT ILLNESS:

A 66-year-old right-handed man with hypertension, non-insulin diabetes, peptic

ulcer disease. He has had daily headaches bitemporal for the last four years he

tells me.

 

He says he just passed out a few minutes ago and does not remember come into

the hospital.  Evidently his memory is not very good in general.

 

He has never been seen by neurology before here.

 

He was admitted yesterday with a history of dementia. He ambulates in a

wheelchair in a skilled nursing facility.  He was found slumped over in his

wheelchair with change in mental status.

 

In April of this year, he had a hip fracture.

 

According to the emergency room notes, he was doing okay at eight o'clock this

morning and was given oxycodone for pain, which he had not taken for eight days

and then when he was found later he was found slumped over in his wheelchair.

His [place] Coma Score was 8.

 

SOCIAL HISTORY:

Not a smoker or a drinker.  He lives by himself in [facility].

 

FAMILY HISTORY:

Negative for cancer, seizure or stroke.

 

PAST MEDICAL HISTORY:

1. Anxiety.

2. Depression.

3. Dementia.

4. Esophageal gastric cancer.

 

MEDICATIONS:

He is on:

1. Bisacodyl.

2. Aspirin 81.

3. Norco.

4. Abilify 2 milligrams a day.

5. Trazodone.

6. Namenda 10 twice a day.

7. Venlafaxine 150.

8. Senna tabs.

9. Wellbutrin 100 milligrams twice a day.

10. Integra.

 

ALLERGIES:

1. SULFA.

2. PENICILLIN G.

3. PROCHLORPERAZINE.

 

PHYSICAL EXAMINATION:

VITAL SIGNS: On exam,  afebrile, 90, 22, 122/76.

NECK: There were no carotid bruits.

HEART: Regular rhythm. I do not detect a murmur.

 

NEUROLOGICAL EXAMINATION:

Pupils are equal. Visual fields are full. Face is symmetric. Extraocular

movements intact without nystagmus. Tongue was midline. There is no drift.  He

had normal strength in the upper and lower extremities bilaterally. DTRs are

trace throughout. Toes are downgoing bilaterally.  Pin prick is intact

throughout. He is not ataxic on finger-to-nose. He knows  he lives in [place]

in [facility].  He does not know the year or the month. He follows commands well.

He is not a good historian.

 

LABORATORY DATA:

CBC was essentially normal.

 

Basic metabolic profile:  creatinine 1.45,, otherwise normal.

 

His troponin was negative.

 

CPK normal.

 

B12 normal.

 

Albumin 3.1.

 

The urinalysis is negative.

 

IMAGING STUDIES:

Carotid ultrasound normal.

 

MRI of the brain shows no evidence for acute infarction on review of the films.

It does appear to be normal.  Diffusion images are negative.

 

Brain MRI is normal.

 

Echocardiogram is normal.  He has been in sinus rhythm here.

 

IMPRESSION:

Unclear exactly what caused his change in mental status as far as his decreased

level of consciousness, whether it was the narcotic or not, and I would hold

off on the narcotic.  The Wellbutrin appears to have been stopped, and I would

in fact, discontinue his Wellbutrin as that can cause seizures.

 

An EEG can be checked but  if that is negative he could be discharged.

 

It appears that he has a history of dementia as he is on Namenda already.

 

I think any further workup besides the EEG is not indicated.

 

He can be discharged if his EEG is negative.

 

 

                              _________________________________

                              MD EZIO Alvarenga/SUSANA

D:  11/19/2017/5:13 PM

T:  11/19/2017/6:24 PM

Visit #:  [id number]

Job #:  [id number]

## 2017-11-19 NOTE — RADRPT
EXAM DATE/TIME:  11/19/2017 13:28 

 

[facility] COMPARISON:     

No previous studies available for comparison.

        

 

 

INDICATIONS :     

Syncope. 

                     

 

MEDICAL HISTORY :           

Dementia. 

 

SURGICAL HISTORY :          

Gastric surgery. Right hip replacement. 

 

ENCOUNTER:     

Initial

 

ACUITY:     

1 day

 

PAIN SCORE:     

0/10

 

LOCATION:     

Bilateral neck 

                     

PEAK SYSTOLIC VELOCITIES (cm/sec):

 

ICA/CCA RATIO:                    

Right: 1.1     Left: 1.2

 

ICA:                          

Right: 80.9     Left: 88.6

 

CCA:                          

Right: 74.3     Left: 75.9

 

ECA:                           

Right: 74.3     Left: 72.1

 

VERTEBRAL:           

Right: 50.3 antegrade     Left: 36.3 antegrade

             

Elevated flow velocities and ICA/CCA ratios have been found to correlate with increased degrees of

vessel stenosis, calculated as percentage of diameter relative to a normal segment of distal ICA/CCA

 

FINDINGS:     

 

RIGHT CAROTID:     

No significant stenosis is visualized.  The waveforms are within normal limits.

 

LEFT CAROTID:     

No significant stenosis is visualized.  The waveforms are within normal limits.

 

VERTEBRAL ARTERIES:  

Antegrade flow is seen in both vertebral arteries.

 

MISCELLANEOUS:     

None.

 

CONCLUSION:     

No evidence of hemodynamically significant carotid stenosis.

 

 

 

 Timbo George MD on November 19, 2017 at 14:23           

Board Certified Radiologist.

 This report was verified electronically.

## 2017-11-20 NOTE — MG
cc:

CLIVE MARIN M.D.

****

 

 

Lab No:    Date:  11/19/2017 Age:   Sex:  M Race:

 

 

INDICATIONS

A 68-year-old man, slumped over in his chair.

History of dementia.

 

MEDICATIONS

Abilify.

Namenda.

 

FINDINGS

The recording shows an 9-10 Hz synchronous and symmetric, 60 microvolt

posterior rhythm.  Some mild diffuse theta slowing is seen.  No epileptiform or

seizure activity is noted.

 

Photic stimulation is performed without significant posterior driving.

 

IMPRESSION

Some mild diffuse theta slowing consistent with a mild diffuse encephalopathy

but otherwise a normal EEG.  No evidence for a focal abnormality.  No seizure

activity was seen.

 

 

                              _________________________________

                              Clive Marin MD

 

 

 

DJM/SSB

D:  11/19/2017/9:51 PM

T:  11/20/2017/5:10 AM

Visit #:  [id number]

Job #:  [id number]

## 2017-11-20 NOTE — HHI.PR
Subjective


Remarks


Follow up on patient with AMS/acute encephalopathy, dementia.  Patient seen and 

examined.  Patient appears more alert today.  Remains pleasantly confused, 

oriented to self only.  Patient denies any complaints.  (+)BM.  Patient cleared 

for discharge from neurologic standpoint if EEG negative.  Reviewed EEG which 

is unremarkable.





Objective


Vitals





Vital Signs








  Date Time  Temp Pulse Resp B/P (MAP) Pulse Ox O2 Delivery O2 Flow Rate FiO2


 


11/20/17 08:40    149/79 (102)    


 


11/20/17 08:39    146/90 (108)    


 


11/20/17 08:36 97.4 89 16 154/91 (112) 100   


 


11/20/17 04:00  77      


 


11/19/17 23:54  85      


 


11/19/17 23:31 98.5 85 18 134/80 (98) 97   


 


11/19/17 19:30  92      


 


11/19/17 15:42 97.3 90 22 122/76 (91) 99   














I/O      


 


 11/19/17 11/19/17 11/19/17 11/20/17 11/20/17 11/20/17





 07:00 15:00 23:00 07:00 15:00 23:00


 


Intake Total 600 ml    800 ml 


 


Output Total 500 ml   800 ml  


 


Balance 100 ml   -800 ml 800 ml 


 


      


 


Intake Oral 100 ml     


 


IV Total 500 ml    800 ml 


 


Output Urine Total 500 ml   800 ml  


 


# Voids     1 


 


# Bowel Movements 2 2   1 








Result Diagram:  


11/19/17 0600                                                                  

              11/20/17 0610





Imaging





Last Impressions








Carotid Artery Ultrasound 11/19/17 0000 Signed





Impressions: 





 Service Date/Time:  Sunday, November 19, 2017 13:28 - CONCLUSION:  No evidence 





 of hemodynamically significant carotid stenosis.     Timbo George MD 


 


Brain MRI 11/18/17 1043 Signed





Impressions: 





 Service Date/Time:  Saturday, November 18, 2017 13:10 - CONCLUSION:  No 

evidence 





 for acute infarction. Right maxillary sinus disease.     Vinnie Bueno MD 


 


CT Angiography 11/18/17 0905 Signed





Impressions: 





 Service Date/Time:  Saturday, November 18, 2017 09:42 - CONCLUSION:  No 

evidence 





 of pulmonary embolus. Bilateral lower lobe atelectasis unchanged. Post 

surgical 





 findings the mediastinum and diaphragmatic hernia also unchanged.      Timbo George MD 


 


Head CT 11/18/17 0857 Signed





Impressions: 





 Service Date/Time:  Saturday, November 18, 2017 09:11 - CONCLUSION:  1. Severe 





 chronic right maxillary sinusitis unchanged. 2. No acute intracranial 

findings.  





    Timbo George MD 


 


Chest X-Ray 11/18/17 0857 Signed





Impressions: 





 Service Date/Time:  Saturday, November 18, 2017 09:18 - CONCLUSION:  1. Patchy 





 left lower lobe consolidation versus atelectasis and small left pleural 





 effusion. 2. Postsurgical mediastinal configuration unchanged. Left-sided 





 diaphragmatic hernia again seen.     Timbo George MD 








Objective Remarks


GENERAL: Well-nourished, well-developed patient in NAD.  Pleasantly confused.  

Oriented to self only.  Awake and alert.


SKIN: Warm and dry. 


HEAD:  Normocephalic. Atraumatic.


EYES: EOMI. No scleral icterus. No injection or drainage. 


ENT: No nasal bleeding or discharge.  Mucous membranes pink and moist.


NECK: Trachea midline.  


CARDIOVASCULAR: Regular rate and rhythm.  S1, S2 noted. No murmur appreciated. 


RESPIRATORY: Nonlabored. Clear to auscultation. Breath sounds equal 

bilaterally.  


GASTROINTESTINAL: Abdomen soft, non-tender, nondistended. Normoactive bowel 

sounds x4.


MUSCULOSKELETAL: No obvious deformities. Extremities without clubbing, cyanosis

, or edema. 


NEUROLOGICAL: Awake and alert. Able to move all extremities.  No focal 

neurologic findings appreciated.  Normal speech.


PSYCHIATRIC: Appropriate mood and affect; insight and judgment abnormal.


Medications and IVs





Current Medications








 Medications


  (Trade)  Dose


 Ordered  Sig/Mary


 Route  Start Time


 Stop Time Status Last Admin


 


  (Abilify)  2 mg  DAILY@0600


 PO  11/19/17 06:00


    11/20/17 05:15


 


 


  (Aspirin Chew)  81 mg  DAILY


 CHEW  11/19/17 09:00


    11/20/17 08:39


 


 


  (Vitamin D3)  1,000 units  DAILY


 PO  11/19/17 09:00


    11/20/17 08:39


 


 


  (Namenda)  10 mg  BID


 PO  11/18/17 21:00


    11/20/17 08:39


 


 


  (Senokot)  8.6 mg  BID


 PO  11/18/17 21:00


    11/20/17 08:38


 


 


  (NS Flush)  2 ml  UNSCH  PRN


 IV FLUSH  11/18/17 11:15


     


 


 


  (NS Flush)  2 ml  BID


 IV FLUSH  11/18/17 21:00


    11/20/17 08:39


 


 


  (Tylenol)  650 mg  Q4H  PRN


 PO  11/18/17 11:15


     


 


 


  (Zofran Inj)  4 mg  Q6H  PRN


 IVP  11/18/17 11:15


     


 


 


  (Narcan Inj)  0.4 mg  UNSCH  PRN


 IV PUSH  11/18/17 11:15


     


 


 


  (Milk Of


 Magnesia Liq)  30 ml  Q12H  PRN


 PO  11/18/17 11:15


     


 


 


  (Senokot)  17.2 mg  Q12H  PRN


 PO  11/18/17 11:15


     


 


 


  (Dulcolax Supp)  10 mg  DAILY  PRN


 RECTAL  11/18/17 11:15


     


 


 


  (Lactulose Liq)  30 ml  DAILY  PRN


 PO  11/18/17 11:15


     


 


 


  (Vibramycin)  100 mg  BID


 PO  11/19/17 13:00


 11/26/17 12:59  11/20/17 08:38


 


 


  (Heparin Inj)  5,000 units  Q12H


 SQ  11/19/17 22:00


    11/19/17 21:45


 











A/P


Problem List:  


(1) Acute encephalopathy


ICD Code:  G93.40 - Encephalopathy, unspecified


(2) Dementia


ICD Code:  F03.90 - Unspecified dementia without behavioral disturbance


Status:  Chronic


(3) Renal insufficiency


ICD Code:  N28.9 - Disorder of kidney and ureter, unspecified


Status:  Chronic


(4) Anxiety and depression


ICD Code:  F41.9 - Anxiety disorder, unspecified; F32.9 - Major depressive 

disorder, single episode, unspecified


Status:  Acute


Assessment and Plan


65yo male with PMHX of dementia, CKD stage IV, recent ORIF left hip s/p fall 9/ 17 admitted with AMS.





AMS/acute encephalopathy


Syncope/near syncopal episode


   - uncertain etiology, ?dehydration, medication side effect, worsening 

dementia


   - appears patient has returned to baseline


   - reviewed previous medical record, per ED note in April patient with 

multiple witnessed syncopal episodes and bowel incontinence


   - CT head shows severe chronic right maxillary sinusitis


   - MRI brain shows right maxillary sinus disease, no e/o acute infarct.  

Begin Doxycycline 100mg BID.


   - CTA negative for PE


   - CXR personally reviewed showing patchy left lower lobe consolidation vs 

atelectasis and small left pleural effusion.  Patient has no symptoms of 

pneumonia.   IS ordered, encourage hourly use.


   - UA negative for infection.  


   - cardiac enzymes neg x 3


   - Continue to hold sedating medications especially narcotic medications


   - orthostatic BP measurements negative


   - echocardiogram with EF 50-55%, moderate MV and AV regurgitation, AV 

sclerosis and small pleural effusion.  Will have patient follow up with 

cardiology as outpatient.


   - EEG showing mild diffuse encephalopathy otherwise normal


   - Carotid dopplers negative for significant stenosis


   - RPR pending


   - B12 level 317


   - Consult Neurology, appreciate recommendations.  Cleared for discharge from 

Neurology standpoint if EEG negative with recommendations to discontinue 

Wellbutrin.





Dementia


   - Continue on home dose of Namenda





Recent left hip fracture s/p ORIF 4/9/17


   - continue participation with PT





CKD stage IV


   - creatinine stable.  Appears to be at baseline.


   - Avoid nephrotoxic agents


   - monitor as indicated





DVT prophylaxis


   - Heparin sq





Discussed with nursing staff, patient and Maira Rankin Nov 20, 2017 10:28

## 2017-11-20 NOTE — HHI.DS
__________________________________________________





Discharge Summary


Admission Date


Nov 18, 2017 at 11:09


Discharge Date:  Nov 20, 2017


Admitting Diagnosis





altered mental status.  Chronic kidney disease.





(1) Altered mental status


ICD Code:  R41.82 - Altered mental status, unspecified


Status:  Acute


(2) Acute encephalopathy


ICD Code:  G93.40 - Encephalopathy, unspecified


(3) Dementia


ICD Code:  F03.90 - Unspecified dementia without behavioral disturbance


Status:  Chronic


(4) Renal insufficiency


ICD Code:  N28.9 - Disorder of kidney and ureter, unspecified


Status:  Chronic


(5) Anxiety and depression


ICD Code:  F41.9 - Anxiety disorder, unspecified; F32.9 - Major depressive 

disorder, single episode, unspecified


Status:  Acute


(6) CKD (chronic kidney disease) stage 4, GFR 15-29 ml/min


ICD Code:  N18.4 - Chronic kidney disease, stage 4 (severe)


Status:  Acute


(7) Pericardial effusion


ICD Code:  I31.3 - Pericardial effusion (noninflammatory)


(8) Aortic valve regurgitation


ICD Code:  I35.1 - Nonrheumatic aortic (valve) insufficiency


(9) Mitral valve regurgitation


ICD Code:  I34.0 - Nonrheumatic mitral (valve) insufficiency


(10) Cardiomyopathy


ICD Code:  I42.9 - Cardiomyopathy, unspecified


(11) Aortic valve sclerosis


ICD Code:  I35.8 - Other nonrheumatic aortic valve disorders


(12) Maxillary sinusitis


ICD Code:  J32.0 - Chronic maxillary sinusitis


Procedures


None


Brief History - From Admission


66-year-old male with a medical history significant for dementia who normally 

ambulates in a wheelchair at the skilled nursing facility.  The patient was 

found slumped over in his wheelchair with altered mental status.  He was sent 

to the hospital for altered mental status.  The patient has been at the skilled 

nursing facility since April of this year after a hip fracture.


On my evaluation, the patient is awake and can answer some questions 

appropriately.  However he still confused about his whereabouts.  He is not 

sure what happened or why he is here.  Initial head CT unremarkable.  His vital 

signs and labs are unremarkable.  His med list does include some sedating 

medications.


CBC/BMP:  


11/19/17 0600                                                                  

              11/20/17 0610





Significant Findings





Laboratory Tests








Test


  11/18/17


09:03 11/18/17


09:07 11/18/17


10:15 11/18/17


14:49


 


Red Blood Count


  3.81 MIL/MM3


(4.50-5.90) 


  


  


 


 


Hemoglobin


  11.5 GM/DL


(13.0-17.0) 


  


  


 


 


Hematocrit


  34.8 %


(39.0-51.0) 


  


  


 


 


Mean Platelet Volume


  6.7 FL


(7.0-11.0) 


  


  


 


 


Monocytes (%) (Auto)


  13.8 %


(0.0-8.0) 


  


  


 


 


D-Dimer Quantitative (PE/DVT)


  0.57 MG/L FEU


(0.00-0.50) 


  


  


 


 


Blood Urea Nitrogen


  25 MG/DL


(7-18) 


  


  


 


 


Creatinine


  1.77 MG/DL


(0.60-1.30) 


  


  


 


 


Random Glucose


  110 MG/DL


() 


  


  


 


 


Albumin


  3.1 GM/DL


(3.4-5.0) 


  


  


 


 


Alkaline Phosphatase


  142 U/L


() 


  


  


 


 


Anion Gap 4 MEQ/L (5-15)    


 


Estimat Glomerular Filtration


Rate 39 ML/MIN


(>89) 


  


  


 


 


Troponin I


  LESS THAN 0.02


NG/ML 


  


  LESS THAN 0.02


NG/ML


 


Test


  11/18/17


21:59 11/19/17


06:00 11/19/17


14:23 11/20/17


06:10


 


Troponin I


  LESS THAN 0.02


NG/ML 


  


  


 


 


Red Blood Count


  


  3.86 MIL/MM3


(4.50-5.90) 


  


 


 


Hemoglobin


  


  11.2 GM/DL


(13.0-17.0) 


  


 


 


Hematocrit


  


  35.2 %


(39.0-51.0) 


  


 


 


Mean Corpuscular Hemoglobin


Concent 


  31.8 %


(32.0-36.0) 


  


 


 


Mean Platelet Volume


  


  6.7 FL


(7.0-11.0) 


  


 


 


Monocytes (%) (Auto)


  


  15.6 %


(0.0-8.0) 


  


 


 


Monocytes # (Auto)


  


  1.1 TH/MM3


(0-0.9) 


  


 


 


Blood Urea Nitrogen


  


  21 MG/DL


(7-18) 


  19 MG/DL


(7-18)


 


Creatinine


  


  1.45 MG/DL


(0.60-1.30) 


  1.49 MG/DL


(0.60-1.30)


 


Estimat Glomerular Filtration


Rate 


  49 ML/MIN


(>89) 


  47 ML/MIN


(>89)








Imaging





Last Impressions








Carotid Artery Ultrasound 11/19/17 0000 Signed





Impressions: 





 Service Date/Time:  Sunday, November 19, 2017 13:28 - CONCLUSION:  No evidence 





 of hemodynamically significant carotid stenosis.     Timbo George MD 


 


Brain MRI 11/18/17 1043 Signed





Impressions: 





 Service Date/Time:  Saturday, November 18, 2017 13:10 - CONCLUSION:  No 

evidence 





 for acute infarction. Right maxillary sinus disease.     Vinnie Bueno MD 


 


CT Angiography 11/18/17 0905 Signed





Impressions: 





 Service Date/Time:  Saturday, November 18, 2017 09:42 - CONCLUSION:  No 

evidence 





 of pulmonary embolus. Bilateral lower lobe atelectasis unchanged. Post 

surgical 





 findings the mediastinum and diaphragmatic hernia also unchanged.      Timbo George MD 


 


Head CT 11/18/17 0857 Signed





Impressions: 





 Service Date/Time:  Saturday, November 18, 2017 09:11 - CONCLUSION:  1. Severe 





 chronic right maxillary sinusitis unchanged. 2. No acute intracranial 

findings.  





    Timbo George MD 


 


Chest X-Ray 11/18/17 0857 Signed





Impressions: 





 Service Date/Time:  Saturday, November 18, 2017 09:18 - CONCLUSION:  1. Patchy 





 left lower lobe consolidation versus atelectasis and small left pleural 





 effusion. 2. Postsurgical mediastinal configuration unchanged. Left-sided 





 diaphragmatic hernia again seen.     Timbo George MD 








PE at Discharge


GENERAL: Well-nourished, well-developed patient in NAD.  Pleasantly confused.  

Oriented to self only.  Awake and alert.


SKIN: Warm and dry. 


HEAD:  Normocephalic. Atraumatic.


EYES: EOMI. No scleral icterus. No injection or drainage. 


ENT: No nasal bleeding or discharge.  Mucous membranes pink and moist.


NECK: Trachea midline.  


CARDIOVASCULAR: Regular rate and rhythm.  S1, S2 noted. No murmur appreciated. 


RESPIRATORY: Nonlabored. Clear to auscultation. Breath sounds equal 

bilaterally.  


GASTROINTESTINAL: Abdomen soft, non-tender, nondistended. Normoactive bowel 

sounds x4.


MUSCULOSKELETAL: No obvious deformities. Extremities without clubbing, cyanosis

, or edema. 


NEUROLOGICAL: Awake and alert. Able to move all extremities.  No focal 

neurologic findings appreciated.  Normal speech.


PSYCHIATRIC: Appropriate mood and affect; insight and judgment abnormal.


Pt update on day of discharge


Patient seen and examined.  Patient denies any acute medical complaints.  

Cleared for discharge from Neurology standpoint if EEG negative.  Reviewed EEG 

and unremarkable.  Discussed with nursing staff.


Hospital Course


Patient admitted with AMS/acute encephalopathy of uncertain etiology possibly 

due to medication side effect.  Sedating medications were held.  CT of the head 

showed severe chronic right maxillary sinusitis.  MRI of the brain revealed 

only right maxillary sinus disease no evidence of acute infarction.  CTA 

negative for PE.  Chest x-ray showed patchy left lower lobe consolidation 

versus atelectasis and small left pleural effusion.  Patient with no symptoms 

of pneumonia.  Patient started on doxycycline for sinus disease.  UA negative 

for infection.  Troponins were obtained and were negative 3.  Patient 

monitored on telemetry and remained in sinus rhythm.  Orthostatic measurements 

were negative.  Echocardiogram was obtained revealing cardiomyopathy with EF of 

50-55%, moderate mitral valve and aortic valve regurgitation, AV sclerosis and 

small pericardial effusion.  Dopplers were negative for significant stenosis.  

Patient was seen in consultation by neurology who stated patient could be 

discharged from neurologic standpoint if EEG was negative and recommended 

discontinuation of Wellbutrin.  No further workup recommended.  The EEG was 

obtained and showed mild diffuse encephalopathy otherwise normal.  She was 

stage IV chronic kidney disease, creatinine remained stable and at baseline.  

Patient discharged back to nursing home facility.


Pt Condition on Discharge:  Stable


Discharge Disposition:  Discharge to SNF


Discharge Time:  > 30 minutes


Discharge Instructions


DIET: Follow Instructions for:  Heart Healthy Diet


Activities you can perform:  See Additionl Instruction


Other Activity Instructions:  


per PT recommendations


Follow up Referrals:  


Cardiology - 1 Week


Neurology - 1 Week with Clive Grace MD


PCP Follow-up - 1 Week


Psychiatry Adult - 1 Week





New Medications:  


Doxycycline Hyclate (Doxycycline Hyclate) 100 Mg Cap


100 MG PO BID for INFECTION for 6 Days, #12 CAP





 


Continued Medications:  


Aripiprazole (Abilify) 2 Mg Tab


2 MG PO DAILY@0600, #30 TAB 3 Refills





Aspirin (Aspirin Low Dose) 81 Mg Chew


81 MG CHEW DAILY for Prevent Blood Clot, #30 TAB 0 Refills





Bisacodyl Supp (Bisac-Evac Supp) 10 Mg Supp


10 MG RECTAL DAILY PRN for SEVERE CONSITIPATION, #30 TAB





Cholecalciferol (Vitamin D3) 1,000 Unit Cap


1000 UNITS PO DAILY for Nutritional Supplement, #1 BOTTLE 0 Refills





Memantine (Namenda) 10 Mg Tab


10 MG PO BID for Alzheimer Disease, #60 TAB 3 Refills





Multi-Vit/Iron-B Comp-Vit C (Integra) Unknown Strength Cap


Unknown Dose





Sennosides (Senna-Tabs) 8.6 Mg Tab


8.6 MG PO BID for Constipation, #30 TAB 0 Refills





Venlafaxine ER 24 HR (Venlafaxine ER 24 HR) 150 Mg Cap


150 MG PO HS, #30 CAP 4 Refills





 


Discontinued Medications:  


Bupropion HCl (Bupropion HCl) 100 Mg Tab


100 MG PO BID for Control Depression, TAB 0 Refills





Hydrocodone-Acetaminophen (Norco) 7.5-325 mg Tab


1 TAB PO Q6H PRN for PAIN, #60 TAB 0 Refills





Trazodone (Trazodone) 50 Mg Tab


50 MG PO HS for Insomnia, #30 TAB 3 Refills





Venlafaxine ER 24 HR (Venlafaxine ER 24 HR) 150 Mg Cap


150 MG PO HS, #30 CAP 0 Refills

















Maira Garcia Nov 20, 2017 10:28

## 2017-11-20 NOTE — HHI.DCPOC
Discharge Care Plan


Diagnosis:  


(1) Altered mental status


(2) CKD (chronic kidney disease) stage 4, GFR 15-29 ml/min


(3) Acute encephalopathy


(4) Dementia


Goals to Promote Your Health


* To prevent worsening of your condition and complications


* To maintain your health at the optimal level


Directions to Meet Your Goals


Please discontinue use of narcotics and other sedating medications





Discontinue use of Wellbutrin





*** Take your medications as prescribed


*** Follow your dietary instruction


*** Follow activity as directed








*** Keep your appointments as scheduled


*** Take your immunizations and boosters as scheduled


*** If your symptoms worsen call your PCP, if no PCP go to Urgent Care Center 

or Emergency Room***


*** Smoking is Dangerous to Your Health. Avoid second hand smoke***


***Call the 24-hour hour crisis hotline for domestic abuse at 1-412.306.2520***











Maira Garcia Nov 20, 2017 09:58

## 2021-03-03 NOTE — HHI.DS
Lab entered   __________________________________________________





Discharge Summary


Admission Date


Sep 1, 2017 at 11:43 pm


Discharge Date:  Sep 4, 2017


Admitting Diagnosis





Fecal Impaction





(1) Fecal impaction in rectum


ICD Code:  K56.41 - Fecal impaction


Status:  Acute


(2) Renal insufficiency


ICD Code:  N28.9 - Disorder of kidney and ureter, unspecified


Status:  Chronic


(3) Dementia


ICD Code:  F03.90 - Unspecified dementia without behavioral disturbance


Status:  Chronic


Procedures


None.


Brief History - From Admission


This is a 66-year-old male with a PMH of Dementia, Anxiety, Depression and 

Esophageal/Gastric CA s/p Gastrectomy who was sent to the ER from [facility] for eval of possible SBO.  Pt had Abd X-ray done as outpatient 

yesterday which revealed Ileus, sent to ER for possible SBO, no BM for 4-6 days 

per records.  Unable to obtain history from patient due to severe dementia.  On 

arrival, /86, HR 86, O2 sat 97% on RA, Afebrile.  CBC essentially 

unremarkable.  Creatinine 1.84, previously 1.53 on 4/12/17.  INR 0.9.  CT Abd/

Pelvis negative for bowel obstruction, constipation especially in rectum which 

is distended to 8.5cm, unable to manually disimpact by ER physician.  GI 

Consulted by ER physician, plan is for Colonoscopy for disimpaction in am.


CBC/BMP:  


9/2/17 0750                                                                    

            9/2/17 0750





Significant Findings





Laboratory Tests








Test


  9/1/17


19:50 9/2/17


07:50


 


Hemoglobin


  12.5 GM/DL


(13.0-17.0) 


 


 


Monocytes (%) (Auto)


  16.0 %


(0.0-8.0) 14.7 %


(0.0-8.0)


 


Monocytes # (Auto)


  1.4 TH/MM3


(0-0.9) 


 


 


Blood Urea Nitrogen


  23 MG/DL


(7-18) 


 


 


Creatinine


  1.84 MG/DL


(0.60-1.30) 1.72 MG/DL


(0.60-1.30)


 


Albumin


  3.0 GM/DL


(3.4-5.0) 3.0 GM/DL


(3.4-5.0)


 


Alkaline Phosphatase


  165 U/L


() 161 U/L


()


 


Estimat Glomerular Filtration


Rate 37 ML/MIN


(>89) 40 ML/MIN


(>89)








Imaging





Last Impressions








Abdomen/Pelvis CT 9/1/17 2018 Signed





Impressions: 





 Service Date/Time:  Friday, September 1, 2017 21:57 - CONCLUSION:  1. Negative 





 for bowel obstruction. 2. Constipation, especially in the rectum which is 





 distended to 8.5 cm. 3. Previous distal esophagectomy and gastric pull-through 





 procedure with herniation of colon and to the lower left chest.     Marcial Wells MD 








PE at Discharge


GENERAL: Well-nourished, well-developed male patient in NAD.


SKIN: Warm and dry. No rash.


HEENT:  Normocephalic. Atraumatic. Pupils equal and round. Mucous membranes 

pink and moist.


CARDIOVASCULAR: Regular rate and rhythm.  S1, S2 noted. No murmur appreciated. 


RESPIRATORY: No accessory muscle use. Clear to auscultation. Breath sounds 

equal bilaterally.  


GASTROINTESTINAL: Abdomen soft, nondistended, nontender. Normoactive bowel 

sounds x4.


MUSCULOSKELETAL: No obvious deformities. Extremities without clubbing, cyanosis

, or edema. 


NEUROLOGICAL: Awake and alert. No obvious cranial nerve deficits.  Motor 

grossly within normal limits.


PSYCHIATRIC: Pleasantly confused mood and affect; insight and judgment limited.


Hospital Course


66-year-old male with a PMH of Dementia, Anxiety, Depression and Esophageal/

Gastric CA s/p Gastrectomy who was sent from [facility] for eval 

of possible SBO





Fecal Impaction/Severe Constipation: Outpatient X-ray w/ evidence of Ileus, 

sent to ER from Rehab for concern for bowel obstruction.  CT Abd/Pelvis 

negative for bowel obstruction, +constipation w/ rectum distended to 8.5cm, 

images reviewed by me.  GI consulted, started on Miralax and Dulcolax q1h 

u6cbctw each scheduled daily. Given clear liquid diet. Given IVF, analgesics/

antiemetics as needed. GI following, if patient starts passing watery stools, 

miralax can be stopped and he can be discharged. This afternoon patient had 2 

watery and very loose stools. He has no abdominal complaints. Symptoms 

resolved. Will discharge home. 





Abdominal Hernia: GI requested General surgery evaluation for the hernia 

possibly contributing to above symptoms, general surgery recommended medical 

management and close observation, no surgical intervention at this time, 

outpatient f/up.


Pt Condition on Discharge:  Stable


Discharge Disposition:  Discharge to SNF


Discharge Time:  > 30 minutes


Discharge Instructions


DIET: Follow Instructions for:  Heart Healthy Diet


Activities you can perform:  Regular-No Restrictions


Follow up Referrals:  


PCP Follow-up - 1 Week with Clive Caldwell MD





New Medications:  


Bisacodyl Supp (Bisac-Evac Supp) 10 Mg Supp


10 MG RECTAL DAILY PRN for SEVERE CONSITIPATION, #30 TAB





 


Changed Medications:  


Sennosides-Docusate Sodium (Ilsa-Colace) 8.6-50 Mg Tab


2 TAB PO BID PRN for Constipation, #60 TAB 0 Refills (Changed from: 1 TAB; 20)





 


Continued Medications:  


Aripiprazole (Abilify) 2 Mg Tab


2 MG PO DAILY@0600, #30 TAB 3 Refills





Aspirin (Aspirin Low Dose) 81 Mg Chew


81 MG CHEW DAILY for Prevent Blood Clot, #30 TAB 0 Refills





Bupropion HCl ER 12 HR (Wellbutrin SR 12 HR) 150 Mg Tab


150 MG PO 1qam,1q4pm for Control Depression, #60 TAB 3 Refills





Cholecalciferol (Vitamin D3) 1,000 Unit Cap


1000 UNITS PO DAILY for Nutritional Supplement, #1 BOTTLE 0 Refills





Hydrocodone-Acetaminophen (Norco) 7.5-325 mg Tab


1 TAB PO Q6H PRN for PAIN, #60 TAB 0 Refills





Memantine (Namenda) 10 Mg Tab


10 MG PO BID for Alzheimer Disease, #60 TAB 3 Refills





Multi-Vit/Iron-B Comp-Vit C (Integra) Unknown Strength Cap


Unknown Dose





Trazodone (Trazodone) 50 Mg Tab


50 MG PO HS for Insomnia, #30 TAB 3 Refills





Venlafaxine ER 24 HR (Venlafaxine ER 24 HR) 150 Mg Cap


150 MG PO HS, #30 CAP 4 Refills

















Yaa Yoo PA-C Sep 4, 2017 6:20 pm

## 2024-10-22 NOTE — RADHPO
EXAM DATE/TIME:  04/09/2017 02:56 

 

[facility] COMPARISON:     

CT CERVICAL SPINE W/O CONTRAST, May 05, 2015, 21:08.

 

 

INDICATIONS :     

Trauma. Fall.

                      

 

RADIATION DOSE:     

26.43 CTDIvol (mGy) 

 

 

 

MEDICAL HISTORY :     

Carcinoma, esophageal. Carcinoma, gastric. 

 

SURGICAL HISTORY :       

Partial gastrectomy

 

ENCOUNTER:      

Initial

 

ACUITY:      

1 day

 

PAIN SCALE:      

0/10

 

LOCATION:        

neck 

 

TECHNIQUE:     

Volumetric scanning of the cervical spine was performed. Multiplanar reconstructions in the sagittal,
 coronal and oblique axial planes were performed.   Using automated exposure control and adjustment o
f the mA and/or kV according to patient size, radiation dose was kept as low as reasonably achievable
 to obtain optimal diagnostic quality images. 

 

FINDINGS:     

 

VERTEBRAE:     

Normal vertebral body height.

 

ALIGNMENT:     

No evidence of subluxation.

 

C2-C3:  

The bony spinal canal is normal in size.  No evidence of disc bulge or herniation.  The neural forami
na are bilaterally patent.

 

C3-C4:  

The bony spinal canal is normal in size.  No evidence of disc bulge or herniation.  The neural forami
na are bilaterally patent. There is uncovertebral hypertrophy and mild right facet hypertrophy.

 

C4-C5:  

The bony spinal canal is normal in size.  No evidence of disc bulge or herniation. There is bilateral
 uncovertebral hypertrophy causing some narrowing of the neural foramina.

 

C5-C6:  

There is minimal bulging and posterior osteophytic ridging causing mild impression on the thecal sac.
 The neural foramina are bilaterally patent. There is uncovertebral hypertrophy.

 

C6-C7:  

The bony spinal canal is normal in size.  No evidence of disc bulge or herniation.  The neural forami
na are bilaterally patent.

 

C7-T1:  

The bony spinal canal is normal in size.  No evidence of disc bulge or herniation.  The neural forami
na are bilaterally patent.

 

CONCLUSION:     

Scattered degenerative change as described above. An acute bony abnormality is not seen.

 

 

 John Dowling MD on April 09, 2017 at 3:24           

Board Certified Radiologist.

 This report was verified electronically. 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  Occurred 1 to 2 hours ago.  Has not taken anything for pain.  Denies any possibility of pregnancy.